# Patient Record
Sex: MALE | Race: WHITE | NOT HISPANIC OR LATINO | URBAN - METROPOLITAN AREA
[De-identification: names, ages, dates, MRNs, and addresses within clinical notes are randomized per-mention and may not be internally consistent; named-entity substitution may affect disease eponyms.]

---

## 2019-01-01 ENCOUNTER — INPATIENT (INPATIENT)
Facility: HOSPITAL | Age: 84
LOS: 2 days | End: 2019-09-03
Attending: INTERNAL MEDICINE | Admitting: INTERNAL MEDICINE
Payer: MEDICARE

## 2019-01-01 VITALS
DIASTOLIC BLOOD PRESSURE: 91 MMHG | WEIGHT: 220.02 LBS | OXYGEN SATURATION: 95 % | HEIGHT: 70 IN | HEART RATE: 108 BPM | SYSTOLIC BLOOD PRESSURE: 161 MMHG | TEMPERATURE: 99 F | RESPIRATION RATE: 16 BRPM

## 2019-01-01 VITALS — RESPIRATION RATE: 6 BRPM

## 2019-01-01 DIAGNOSIS — A41.51 SEPSIS DUE TO ESCHERICHIA COLI [E. COLI]: ICD-10-CM

## 2019-01-01 DIAGNOSIS — N39.0 URINARY TRACT INFECTION, SITE NOT SPECIFIED: ICD-10-CM

## 2019-01-01 DIAGNOSIS — Z96.653 PRESENCE OF ARTIFICIAL KNEE JOINT, BILATERAL: Chronic | ICD-10-CM

## 2019-01-01 DIAGNOSIS — H25.89 OTHER AGE-RELATED CATARACT: Chronic | ICD-10-CM

## 2019-01-01 DIAGNOSIS — Z95.5 PRESENCE OF CORONARY ANGIOPLASTY IMPLANT AND GRAFT: Chronic | ICD-10-CM

## 2019-01-01 LAB
-  AMIKACIN: SIGNIFICANT CHANGE UP
-  AMIKACIN: SIGNIFICANT CHANGE UP
-  AMPICILLIN/SULBACTAM: SIGNIFICANT CHANGE UP
-  AMPICILLIN/SULBACTAM: SIGNIFICANT CHANGE UP
-  AMPICILLIN: SIGNIFICANT CHANGE UP
-  AMPICILLIN: SIGNIFICANT CHANGE UP
-  AZTREONAM: SIGNIFICANT CHANGE UP
-  AZTREONAM: SIGNIFICANT CHANGE UP
-  CEFAZOLIN: SIGNIFICANT CHANGE UP
-  CEFAZOLIN: SIGNIFICANT CHANGE UP
-  CEFEPIME: SIGNIFICANT CHANGE UP
-  CEFEPIME: SIGNIFICANT CHANGE UP
-  CEFOXITIN: SIGNIFICANT CHANGE UP
-  CEFOXITIN: SIGNIFICANT CHANGE UP
-  CEFTRIAXONE: SIGNIFICANT CHANGE UP
-  CEFTRIAXONE: SIGNIFICANT CHANGE UP
-  CIPROFLOXACIN: SIGNIFICANT CHANGE UP
-  CIPROFLOXACIN: SIGNIFICANT CHANGE UP
-  ERTAPENEM: SIGNIFICANT CHANGE UP
-  GENTAMICIN: SIGNIFICANT CHANGE UP
-  GENTAMICIN: SIGNIFICANT CHANGE UP
-  IMIPENEM: SIGNIFICANT CHANGE UP
-  IMIPENEM: SIGNIFICANT CHANGE UP
-  LEVOFLOXACIN: SIGNIFICANT CHANGE UP
-  LEVOFLOXACIN: SIGNIFICANT CHANGE UP
-  MEROPENEM: SIGNIFICANT CHANGE UP
-  MEROPENEM: SIGNIFICANT CHANGE UP
-  NITROFURANTOIN: SIGNIFICANT CHANGE UP
-  PIPERACILLIN/TAZOBACTAM: SIGNIFICANT CHANGE UP
-  PIPERACILLIN/TAZOBACTAM: SIGNIFICANT CHANGE UP
-  TIGECYCLINE: SIGNIFICANT CHANGE UP
-  TOBRAMYCIN: SIGNIFICANT CHANGE UP
-  TOBRAMYCIN: SIGNIFICANT CHANGE UP
-  TRIMETHOPRIM/SULFAMETHOXAZOLE: SIGNIFICANT CHANGE UP
-  TRIMETHOPRIM/SULFAMETHOXAZOLE: SIGNIFICANT CHANGE UP
ALBUMIN SERPL ELPH-MCNC: 3.2 G/DL — LOW (ref 3.5–5.2)
ALBUMIN SERPL ELPH-MCNC: 3.8 G/DL — SIGNIFICANT CHANGE UP (ref 3.5–5.2)
ALBUMIN SERPL ELPH-MCNC: 4.4 G/DL — SIGNIFICANT CHANGE UP (ref 3.5–5.2)
ALP SERPL-CCNC: 71 U/L — SIGNIFICANT CHANGE UP (ref 30–115)
ALP SERPL-CCNC: 76 U/L — SIGNIFICANT CHANGE UP (ref 30–115)
ALP SERPL-CCNC: 84 U/L — SIGNIFICANT CHANGE UP (ref 30–115)
ALT FLD-CCNC: 12 U/L — SIGNIFICANT CHANGE UP (ref 0–41)
ALT FLD-CCNC: 13 U/L — SIGNIFICANT CHANGE UP (ref 0–41)
ALT FLD-CCNC: 14 U/L — SIGNIFICANT CHANGE UP (ref 0–41)
ANION GAP SERPL CALC-SCNC: 13 MMOL/L — SIGNIFICANT CHANGE UP (ref 7–14)
ANION GAP SERPL CALC-SCNC: 14 MMOL/L — SIGNIFICANT CHANGE UP (ref 7–14)
ANION GAP SERPL CALC-SCNC: 15 MMOL/L — HIGH (ref 7–14)
ANION GAP SERPL CALC-SCNC: 18 MMOL/L — HIGH (ref 7–14)
ANISOCYTOSIS BLD QL: SLIGHT — SIGNIFICANT CHANGE UP
APPEARANCE UR: ABNORMAL
APTT BLD: 26.8 SEC — LOW (ref 27–39.2)
APTT BLD: 28.4 SEC — SIGNIFICANT CHANGE UP (ref 27–39.2)
AST SERPL-CCNC: 27 U/L — SIGNIFICANT CHANGE UP (ref 0–41)
AST SERPL-CCNC: 27 U/L — SIGNIFICANT CHANGE UP (ref 0–41)
AST SERPL-CCNC: 32 U/L — SIGNIFICANT CHANGE UP (ref 0–41)
BACTERIA # UR AUTO: ABNORMAL /HPF
BASE EXCESS BLDA CALC-SCNC: -3 MMOL/L — LOW (ref -2–2)
BASE EXCESS BLDV CALC-SCNC: 3.6 MMOL/L — HIGH (ref -2–2)
BASOPHILS # BLD AUTO: 0 K/UL — SIGNIFICANT CHANGE UP (ref 0–0.2)
BASOPHILS # BLD AUTO: 0.05 K/UL — SIGNIFICANT CHANGE UP (ref 0–0.2)
BASOPHILS NFR BLD AUTO: 0 % — SIGNIFICANT CHANGE UP (ref 0–1)
BASOPHILS NFR BLD AUTO: 0.2 % — SIGNIFICANT CHANGE UP (ref 0–1)
BILIRUB SERPL-MCNC: 2.5 MG/DL — HIGH (ref 0.2–1.2)
BILIRUB SERPL-MCNC: 2.7 MG/DL — HIGH (ref 0.2–1.2)
BILIRUB SERPL-MCNC: 3.6 MG/DL — HIGH (ref 0.2–1.2)
BILIRUB UR-MCNC: NEGATIVE — SIGNIFICANT CHANGE UP
BLD GP AB SCN SERPL QL: SIGNIFICANT CHANGE UP
BUN SERPL-MCNC: 15 MG/DL — SIGNIFICANT CHANGE UP (ref 10–20)
BUN SERPL-MCNC: 16 MG/DL — SIGNIFICANT CHANGE UP (ref 10–20)
BUN SERPL-MCNC: 20 MG/DL — SIGNIFICANT CHANGE UP (ref 10–20)
BUN SERPL-MCNC: 21 MG/DL — HIGH (ref 10–20)
BURR CELLS BLD QL SMEAR: PRESENT — SIGNIFICANT CHANGE UP
CA-I SERPL-SCNC: 1.16 MMOL/L — SIGNIFICANT CHANGE UP (ref 1.12–1.3)
CALCIUM SERPL-MCNC: 7.8 MG/DL — LOW (ref 8.5–10.1)
CALCIUM SERPL-MCNC: 7.9 MG/DL — LOW (ref 8.5–10.1)
CALCIUM SERPL-MCNC: 8.5 MG/DL — SIGNIFICANT CHANGE UP (ref 8.5–10.1)
CALCIUM SERPL-MCNC: 9.4 MG/DL — SIGNIFICANT CHANGE UP (ref 8.5–10.1)
CHLORIDE SERPL-SCNC: 102 MMOL/L — SIGNIFICANT CHANGE UP (ref 98–110)
CHLORIDE SERPL-SCNC: 104 MMOL/L — SIGNIFICANT CHANGE UP (ref 98–110)
CHLORIDE SERPL-SCNC: 99 MMOL/L — SIGNIFICANT CHANGE UP (ref 98–110)
CHLORIDE SERPL-SCNC: 99 MMOL/L — SIGNIFICANT CHANGE UP (ref 98–110)
CK MB CFR SERPL CALC: 1.9 NG/ML — SIGNIFICANT CHANGE UP (ref 0.6–6.3)
CK SERPL-CCNC: 61 U/L — SIGNIFICANT CHANGE UP (ref 0–225)
CO2 SERPL-SCNC: 21 MMOL/L — SIGNIFICANT CHANGE UP (ref 17–32)
CO2 SERPL-SCNC: 21 MMOL/L — SIGNIFICANT CHANGE UP (ref 17–32)
CO2 SERPL-SCNC: 22 MMOL/L — SIGNIFICANT CHANGE UP (ref 17–32)
CO2 SERPL-SCNC: 27 MMOL/L — SIGNIFICANT CHANGE UP (ref 17–32)
COLOR SPEC: YELLOW — SIGNIFICANT CHANGE UP
CREAT SERPL-MCNC: 1 MG/DL — SIGNIFICANT CHANGE UP (ref 0.7–1.5)
CREAT SERPL-MCNC: 1.2 MG/DL — SIGNIFICANT CHANGE UP (ref 0.7–1.5)
CREAT SERPL-MCNC: 1.2 MG/DL — SIGNIFICANT CHANGE UP (ref 0.7–1.5)
CREAT SERPL-MCNC: 1.3 MG/DL — SIGNIFICANT CHANGE UP (ref 0.7–1.5)
CULTURE RESULTS: SIGNIFICANT CHANGE UP
DIFF PNL FLD: ABNORMAL
E COLI DNA BLD POS QL NAA+NON-PROBE: SIGNIFICANT CHANGE UP
EOSINOPHIL # BLD AUTO: 0 K/UL — SIGNIFICANT CHANGE UP (ref 0–0.7)
EOSINOPHIL NFR BLD AUTO: 0 % — SIGNIFICANT CHANGE UP (ref 0–8)
EOSINOPHIL NFR BLD AUTO: 0 % — SIGNIFICANT CHANGE UP (ref 0–8)
GAS PNL BLDA: SIGNIFICANT CHANGE UP
GAS PNL BLDV: 141 MMOL/L — SIGNIFICANT CHANGE UP (ref 136–145)
GAS PNL BLDV: SIGNIFICANT CHANGE UP
GLUCOSE BLDC GLUCOMTR-MCNC: 101 MG/DL — HIGH (ref 70–99)
GLUCOSE BLDC GLUCOMTR-MCNC: 112 MG/DL — HIGH (ref 70–99)
GLUCOSE BLDC GLUCOMTR-MCNC: 120 MG/DL — HIGH (ref 70–99)
GLUCOSE SERPL-MCNC: 112 MG/DL — HIGH (ref 70–99)
GLUCOSE SERPL-MCNC: 140 MG/DL — HIGH (ref 70–99)
GLUCOSE SERPL-MCNC: 149 MG/DL — HIGH (ref 70–99)
GLUCOSE SERPL-MCNC: 156 MG/DL — HIGH (ref 70–99)
GLUCOSE UR QL: NEGATIVE MG/DL — SIGNIFICANT CHANGE UP
GRAM STN FLD: SIGNIFICANT CHANGE UP
HCO3 BLDA-SCNC: 21 MMOL/L — LOW (ref 23–27)
HCO3 BLDV-SCNC: 29 MMOL/L — SIGNIFICANT CHANGE UP (ref 22–29)
HCT VFR BLD CALC: 35.9 % — LOW (ref 42–52)
HCT VFR BLD CALC: 36 % — LOW (ref 42–52)
HCT VFR BLD CALC: 36.6 % — LOW (ref 42–52)
HCT VFR BLD CALC: 41.1 % — LOW (ref 42–52)
HCT VFR BLDA CALC: 51.6 % — HIGH (ref 34–44)
HGB BLD CALC-MCNC: 16.8 G/DL — SIGNIFICANT CHANGE UP (ref 14–18)
HGB BLD-MCNC: 11.9 G/DL — LOW (ref 14–18)
HGB BLD-MCNC: 12.1 G/DL — LOW (ref 14–18)
HGB BLD-MCNC: 12.3 G/DL — LOW (ref 14–18)
HGB BLD-MCNC: 14 G/DL — SIGNIFICANT CHANGE UP (ref 14–18)
HOROWITZ INDEX BLDA+IHG-RTO: 21 — SIGNIFICANT CHANGE UP
HOROWITZ INDEX BLDV+IHG-RTO: 21 — SIGNIFICANT CHANGE UP
IMM GRANULOCYTES NFR BLD AUTO: 0.8 % — HIGH (ref 0.1–0.3)
INR BLD: 1.12 RATIO — SIGNIFICANT CHANGE UP (ref 0.65–1.3)
INR BLD: 1.25 RATIO — SIGNIFICANT CHANGE UP (ref 0.65–1.3)
KETONES UR-MCNC: NEGATIVE — SIGNIFICANT CHANGE UP
LACTATE BLDV-MCNC: 2.2 MMOL/L — HIGH (ref 0.5–1.6)
LACTATE SERPL-SCNC: 2.1 MMOL/L — SIGNIFICANT CHANGE UP (ref 0.5–2.2)
LACTATE SERPL-SCNC: 5.4 MMOL/L — CRITICAL HIGH (ref 0.5–2.2)
LEUKOCYTE ESTERASE UR-ACNC: ABNORMAL
LYMPHOCYTES # BLD AUTO: 1.03 K/UL — LOW (ref 1.2–3.4)
LYMPHOCYTES # BLD AUTO: 3.53 K/UL — HIGH (ref 1.2–3.4)
LYMPHOCYTES # BLD AUTO: 4.3 % — LOW (ref 20.5–51.1)
LYMPHOCYTES # BLD AUTO: 9 % — LOW (ref 20.5–51.1)
MACROCYTES BLD QL: SLIGHT — SIGNIFICANT CHANGE UP
MAGNESIUM SERPL-MCNC: 1.4 MG/DL — LOW (ref 1.8–2.4)
MAGNESIUM SERPL-MCNC: 2.3 MG/DL — SIGNIFICANT CHANGE UP (ref 1.8–2.4)
MANUAL DIF COMMENT BLD-IMP: SIGNIFICANT CHANGE UP
MANUAL SMEAR VERIFICATION: SIGNIFICANT CHANGE UP
MCHC RBC-ENTMCNC: 33.1 G/DL — SIGNIFICANT CHANGE UP (ref 32–37)
MCHC RBC-ENTMCNC: 33.2 PG — HIGH (ref 27–31)
MCHC RBC-ENTMCNC: 33.5 PG — HIGH (ref 27–31)
MCHC RBC-ENTMCNC: 33.6 G/DL — SIGNIFICANT CHANGE UP (ref 32–37)
MCHC RBC-ENTMCNC: 33.6 PG — HIGH (ref 27–31)
MCHC RBC-ENTMCNC: 33.6 PG — HIGH (ref 27–31)
MCHC RBC-ENTMCNC: 33.7 G/DL — SIGNIFICANT CHANGE UP (ref 32–37)
MCHC RBC-ENTMCNC: 34.1 G/DL — SIGNIFICANT CHANGE UP (ref 32–37)
MCV RBC AUTO: 100.6 FL — HIGH (ref 80–94)
MCV RBC AUTO: 98.6 FL — HIGH (ref 80–94)
MCV RBC AUTO: 99.7 FL — HIGH (ref 80–94)
MCV RBC AUTO: 99.7 FL — HIGH (ref 80–94)
METAMYELOCYTES # FLD: 1 % — HIGH (ref 0–0)
METHOD TYPE: SIGNIFICANT CHANGE UP
MICROCYTES BLD QL: SLIGHT — SIGNIFICANT CHANGE UP
MONOCYTES # BLD AUTO: 0.66 K/UL — HIGH (ref 0.1–0.6)
MONOCYTES # BLD AUTO: 0.78 K/UL — HIGH (ref 0.1–0.6)
MONOCYTES NFR BLD AUTO: 2 % — SIGNIFICANT CHANGE UP (ref 1.7–9.3)
MONOCYTES NFR BLD AUTO: 2.8 % — SIGNIFICANT CHANGE UP (ref 1.7–9.3)
NEUTROPHILS # BLD AUTO: 21.83 K/UL — HIGH (ref 1.4–6.5)
NEUTROPHILS # BLD AUTO: 33.29 K/UL — HIGH (ref 1.4–6.5)
NEUTROPHILS NFR BLD AUTO: 62 % — SIGNIFICANT CHANGE UP (ref 42.2–75.2)
NEUTROPHILS NFR BLD AUTO: 91.9 % — HIGH (ref 42.2–75.2)
NEUTS BAND # BLD: 23 % — HIGH (ref 0–6)
NEUTS BAND # BLD: 9 % — HIGH (ref 0–6)
NEUTS VAC BLD QL SMEAR: SLIGHT — SIGNIFICANT CHANGE UP
NITRITE UR-MCNC: POSITIVE
NRBC # BLD: 0 /100 WBCS — SIGNIFICANT CHANGE UP (ref 0–0)
NRBC # BLD: 0 /100 — SIGNIFICANT CHANGE UP (ref 0–0)
NRBC # BLD: 0 /100 — SIGNIFICANT CHANGE UP (ref 0–0)
NRBC # BLD: SIGNIFICANT CHANGE UP /100 WBCS (ref 0–0)
ORGANISM # SPEC MICROSCOPIC CNT: SIGNIFICANT CHANGE UP
PCO2 BLDA: 33 MMHG — LOW (ref 38–42)
PCO2 BLDV: 44 MMHG — SIGNIFICANT CHANGE UP (ref 41–51)
PH BLDA: 7.41 — SIGNIFICANT CHANGE UP (ref 7.38–7.42)
PH BLDV: 7.43 — SIGNIFICANT CHANGE UP (ref 7.26–7.43)
PH UR: 6.5 — SIGNIFICANT CHANGE UP (ref 5–8)
PLAT MORPH BLD: NORMAL — SIGNIFICANT CHANGE UP
PLAT MORPH BLD: SIGNIFICANT CHANGE UP
PLATELET # BLD AUTO: 152 K/UL — SIGNIFICANT CHANGE UP (ref 130–400)
PLATELET # BLD AUTO: 154 K/UL — SIGNIFICANT CHANGE UP (ref 130–400)
PLATELET # BLD AUTO: 226 K/UL — SIGNIFICANT CHANGE UP (ref 130–400)
PLATELET # BLD AUTO: 227 K/UL — SIGNIFICANT CHANGE UP (ref 130–400)
PO2 BLDA: 74 MMHG — LOW (ref 78–95)
PO2 BLDV: 21 MMHG — SIGNIFICANT CHANGE UP (ref 20–40)
POTASSIUM BLDV-SCNC: 4.2 MMOL/L — SIGNIFICANT CHANGE UP (ref 3.3–5.6)
POTASSIUM SERPL-MCNC: 3.8 MMOL/L — SIGNIFICANT CHANGE UP (ref 3.5–5)
POTASSIUM SERPL-MCNC: 3.8 MMOL/L — SIGNIFICANT CHANGE UP (ref 3.5–5)
POTASSIUM SERPL-MCNC: 4.1 MMOL/L — SIGNIFICANT CHANGE UP (ref 3.5–5)
POTASSIUM SERPL-MCNC: 4.1 MMOL/L — SIGNIFICANT CHANGE UP (ref 3.5–5)
POTASSIUM SERPL-SCNC: 3.8 MMOL/L — SIGNIFICANT CHANGE UP (ref 3.5–5)
POTASSIUM SERPL-SCNC: 3.8 MMOL/L — SIGNIFICANT CHANGE UP (ref 3.5–5)
POTASSIUM SERPL-SCNC: 4.1 MMOL/L — SIGNIFICANT CHANGE UP (ref 3.5–5)
POTASSIUM SERPL-SCNC: 4.1 MMOL/L — SIGNIFICANT CHANGE UP (ref 3.5–5)
PROT SERPL-MCNC: 5 G/DL — LOW (ref 6–8)
PROT SERPL-MCNC: 5.7 G/DL — LOW (ref 6–8)
PROT SERPL-MCNC: 6.7 G/DL — SIGNIFICANT CHANGE UP (ref 6–8)
PROT UR-MCNC: 30 MG/DL
PROTHROM AB SERPL-ACNC: 12.9 SEC — HIGH (ref 9.95–12.87)
PROTHROM AB SERPL-ACNC: 14.4 SEC — HIGH (ref 9.95–12.87)
RBC # BLD: 3.58 M/UL — LOW (ref 4.7–6.1)
RBC # BLD: 3.6 M/UL — LOW (ref 4.7–6.1)
RBC # BLD: 3.67 M/UL — LOW (ref 4.7–6.1)
RBC # BLD: 4.17 M/UL — LOW (ref 4.7–6.1)
RBC # FLD: 15.8 % — HIGH (ref 11.5–14.5)
RBC # FLD: 15.9 % — HIGH (ref 11.5–14.5)
RBC # FLD: 16 % — HIGH (ref 11.5–14.5)
RBC # FLD: 16.3 % — HIGH (ref 11.5–14.5)
RBC BLD AUTO: ABNORMAL
RBC BLD AUTO: NORMAL — SIGNIFICANT CHANGE UP
RBC CASTS # UR COMP ASSIST: >50 /HPF
SAO2 % BLDA: 95 % — SIGNIFICANT CHANGE UP (ref 94–98)
SAO2 % BLDV: 32 % — SIGNIFICANT CHANGE UP
SODIUM SERPL-SCNC: 137 MMOL/L — SIGNIFICANT CHANGE UP (ref 135–146)
SODIUM SERPL-SCNC: 138 MMOL/L — SIGNIFICANT CHANGE UP (ref 135–146)
SODIUM SERPL-SCNC: 139 MMOL/L — SIGNIFICANT CHANGE UP (ref 135–146)
SODIUM SERPL-SCNC: 141 MMOL/L — SIGNIFICANT CHANGE UP (ref 135–146)
SP GR SPEC: 1.01 — SIGNIFICANT CHANGE UP (ref 1.01–1.03)
SPECIMEN SOURCE: SIGNIFICANT CHANGE UP
TOXIC GRANULES BLD QL SMEAR: PRESENT — SIGNIFICANT CHANGE UP
TROPONIN T SERPL-MCNC: 0.02 NG/ML — HIGH
TROPONIN T SERPL-MCNC: 0.03 NG/ML — CRITICAL HIGH
TROPONIN T SERPL-MCNC: <0.01 NG/ML — SIGNIFICANT CHANGE UP
UROBILINOGEN FLD QL: 0.2 MG/DL — SIGNIFICANT CHANGE UP (ref 0.2–0.2)
VARIANT LYMPHS # BLD: 3 % — SIGNIFICANT CHANGE UP (ref 0–5)
WBC # BLD: 16.67 K/UL — HIGH (ref 4.8–10.8)
WBC # BLD: 23.76 K/UL — HIGH (ref 4.8–10.8)
WBC # BLD: 39.17 K/UL — HIGH (ref 4.8–10.8)
WBC # BLD: 7.62 K/UL — SIGNIFICANT CHANGE UP (ref 4.8–10.8)
WBC # FLD AUTO: 16.67 K/UL — HIGH (ref 4.8–10.8)
WBC # FLD AUTO: 23.76 K/UL — HIGH (ref 4.8–10.8)
WBC # FLD AUTO: 39.17 K/UL — HIGH (ref 4.8–10.8)
WBC # FLD AUTO: 7.62 K/UL — SIGNIFICANT CHANGE UP (ref 4.8–10.8)
WBC UR QL: SIGNIFICANT CHANGE UP /HPF

## 2019-01-01 PROCEDURE — 99223 1ST HOSP IP/OBS HIGH 75: CPT | Mod: AI

## 2019-01-01 PROCEDURE — 99233 SBSQ HOSP IP/OBS HIGH 50: CPT | Mod: 25

## 2019-01-01 PROCEDURE — 93970 EXTREMITY STUDY: CPT | Mod: 26

## 2019-01-01 PROCEDURE — 99233 SBSQ HOSP IP/OBS HIGH 50: CPT

## 2019-01-01 PROCEDURE — 71045 X-RAY EXAM CHEST 1 VIEW: CPT | Mod: 26

## 2019-01-01 PROCEDURE — 71045 X-RAY EXAM CHEST 1 VIEW: CPT | Mod: 26,76,77

## 2019-01-01 PROCEDURE — 99222 1ST HOSP IP/OBS MODERATE 55: CPT

## 2019-01-01 PROCEDURE — 99238 HOSP IP/OBS DSCHRG MGMT 30/<: CPT | Mod: 25

## 2019-01-01 PROCEDURE — 76770 US EXAM ABDO BACK WALL COMP: CPT | Mod: 26

## 2019-01-01 PROCEDURE — 70450 CT HEAD/BRAIN W/O DYE: CPT | Mod: 26

## 2019-01-01 PROCEDURE — 99291 CRITICAL CARE FIRST HOUR: CPT

## 2019-01-01 PROCEDURE — 99291 CRITICAL CARE FIRST HOUR: CPT | Mod: 25

## 2019-01-01 RX ORDER — ENOXAPARIN SODIUM 100 MG/ML
40 INJECTION SUBCUTANEOUS DAILY
Refills: 0 | Status: DISCONTINUED | OUTPATIENT
Start: 2019-01-01 | End: 2019-01-01

## 2019-01-01 RX ORDER — MORPHINE SULFATE 50 MG/1
2 CAPSULE, EXTENDED RELEASE ORAL
Qty: 100 | Refills: 0 | Status: DISCONTINUED | OUTPATIENT
Start: 2019-01-01 | End: 2019-01-01

## 2019-01-01 RX ORDER — CHLORHEXIDINE GLUCONATE 213 G/1000ML
15 SOLUTION TOPICAL EVERY 12 HOURS
Refills: 0 | Status: DISCONTINUED | OUTPATIENT
Start: 2019-01-01 | End: 2019-01-01

## 2019-01-01 RX ORDER — CEFTRIAXONE 500 MG/1
1000 INJECTION, POWDER, FOR SOLUTION INTRAMUSCULAR; INTRAVENOUS ONCE
Refills: 0 | Status: COMPLETED | OUTPATIENT
Start: 2019-01-01 | End: 2019-01-01

## 2019-01-01 RX ORDER — ACETAMINOPHEN 500 MG
650 TABLET ORAL EVERY 6 HOURS
Refills: 0 | Status: DISCONTINUED | OUTPATIENT
Start: 2019-01-01 | End: 2019-01-01

## 2019-01-01 RX ORDER — DEXMEDETOMIDINE HYDROCHLORIDE IN 0.9% SODIUM CHLORIDE 4 UG/ML
0.02 INJECTION INTRAVENOUS
Qty: 200 | Refills: 0 | Status: DISCONTINUED | OUTPATIENT
Start: 2019-01-01 | End: 2019-01-01

## 2019-01-01 RX ORDER — SODIUM CHLORIDE 9 MG/ML
1000 INJECTION, SOLUTION INTRAVENOUS
Refills: 0 | Status: DISCONTINUED | OUTPATIENT
Start: 2019-01-01 | End: 2019-01-01

## 2019-01-01 RX ORDER — MORPHINE SULFATE 50 MG/1
4 CAPSULE, EXTENDED RELEASE ORAL ONCE
Refills: 0 | Status: DISCONTINUED | OUTPATIENT
Start: 2019-01-01 | End: 2019-01-01

## 2019-01-01 RX ORDER — MEROPENEM 1 G/30ML
1000 INJECTION INTRAVENOUS EVERY 8 HOURS
Refills: 0 | Status: DISCONTINUED | OUTPATIENT
Start: 2019-01-01 | End: 2019-01-01

## 2019-01-01 RX ORDER — ACETAMINOPHEN 500 MG
975 TABLET ORAL ONCE
Refills: 0 | Status: COMPLETED | OUTPATIENT
Start: 2019-01-01 | End: 2019-01-01

## 2019-01-01 RX ORDER — NOREPINEPHRINE BITARTRATE/D5W 8 MG/250ML
0.05 PLASTIC BAG, INJECTION (ML) INTRAVENOUS
Qty: 8 | Refills: 0 | Status: DISCONTINUED | OUTPATIENT
Start: 2019-01-01 | End: 2019-01-01

## 2019-01-01 RX ORDER — SODIUM CHLORIDE 9 MG/ML
3100 INJECTION, SOLUTION INTRAVENOUS ONCE
Refills: 0 | Status: COMPLETED | OUTPATIENT
Start: 2019-01-01 | End: 2019-01-01

## 2019-01-01 RX ORDER — MAGNESIUM SULFATE 500 MG/ML
2 VIAL (ML) INJECTION ONCE
Refills: 0 | Status: COMPLETED | OUTPATIENT
Start: 2019-01-01 | End: 2019-01-01

## 2019-01-01 RX ORDER — SODIUM CHLORIDE 9 MG/ML
1000 INJECTION INTRAMUSCULAR; INTRAVENOUS; SUBCUTANEOUS ONCE
Refills: 0 | Status: COMPLETED | OUTPATIENT
Start: 2019-01-01 | End: 2019-01-01

## 2019-01-01 RX ORDER — FENTANYL CITRATE 50 UG/ML
0.5 INJECTION INTRAVENOUS
Qty: 2500 | Refills: 0 | Status: DISCONTINUED | OUTPATIENT
Start: 2019-01-01 | End: 2019-01-01

## 2019-01-01 RX ORDER — ASPIRIN/CALCIUM CARB/MAGNESIUM 324 MG
1 TABLET ORAL
Qty: 0 | Refills: 0 | DISCHARGE

## 2019-01-01 RX ORDER — HEPARIN SODIUM 5000 [USP'U]/ML
5000 INJECTION INTRAVENOUS; SUBCUTANEOUS EVERY 8 HOURS
Refills: 0 | Status: DISCONTINUED | OUTPATIENT
Start: 2019-01-01 | End: 2019-01-01

## 2019-01-01 RX ORDER — CIPROFLOXACIN LACTATE 400MG/40ML
400 VIAL (ML) INTRAVENOUS ONCE
Refills: 0 | Status: COMPLETED | OUTPATIENT
Start: 2019-01-01 | End: 2019-01-01

## 2019-01-01 RX ORDER — ASPIRIN/CALCIUM CARB/MAGNESIUM 324 MG
81 TABLET ORAL DAILY
Refills: 0 | Status: DISCONTINUED | OUTPATIENT
Start: 2019-01-01 | End: 2019-01-01

## 2019-01-01 RX ORDER — FENTANYL CITRATE 50 UG/ML
50 INJECTION INTRAVENOUS ONCE
Refills: 0 | Status: DISCONTINUED | OUTPATIENT
Start: 2019-01-01 | End: 2019-01-01

## 2019-01-01 RX ORDER — MIDAZOLAM HYDROCHLORIDE 1 MG/ML
4 INJECTION, SOLUTION INTRAMUSCULAR; INTRAVENOUS ONCE
Refills: 0 | Status: DISCONTINUED | OUTPATIENT
Start: 2019-01-01 | End: 2019-01-01

## 2019-01-01 RX ORDER — SODIUM CHLORIDE 9 MG/ML
1000 INJECTION INTRAMUSCULAR; INTRAVENOUS; SUBCUTANEOUS
Refills: 0 | Status: DISCONTINUED | OUTPATIENT
Start: 2019-01-01 | End: 2019-01-01

## 2019-01-01 RX ORDER — FUROSEMIDE 40 MG
0 TABLET ORAL
Qty: 0 | Refills: 0 | DISCHARGE

## 2019-01-01 RX ORDER — ENOXAPARIN SODIUM 100 MG/ML
100 INJECTION SUBCUTANEOUS ONCE
Refills: 0 | Status: COMPLETED | OUTPATIENT
Start: 2019-01-01 | End: 2019-01-01

## 2019-01-01 RX ORDER — MORPHINE SULFATE 50 MG/1
5 CAPSULE, EXTENDED RELEASE ORAL
Qty: 100 | Refills: 0 | Status: DISCONTINUED | OUTPATIENT
Start: 2019-01-01 | End: 2019-01-01

## 2019-01-01 RX ORDER — FENTANYL CITRATE 50 UG/ML
0.5 INJECTION INTRAVENOUS
Qty: 5000 | Refills: 0 | Status: DISCONTINUED | OUTPATIENT
Start: 2019-01-01 | End: 2019-01-01

## 2019-01-01 RX ORDER — DEXMEDETOMIDINE HYDROCHLORIDE IN 0.9% SODIUM CHLORIDE 4 UG/ML
0.05 INJECTION INTRAVENOUS
Qty: 200 | Refills: 0 | Status: DISCONTINUED | OUTPATIENT
Start: 2019-01-01 | End: 2019-01-01

## 2019-01-01 RX ORDER — ATENOLOL 25 MG/1
0 TABLET ORAL
Qty: 0 | Refills: 0 | DISCHARGE

## 2019-01-01 RX ORDER — POTASSIUM CHLORIDE 20 MEQ
0 PACKET (EA) ORAL
Qty: 0 | Refills: 0 | DISCHARGE

## 2019-01-01 RX ADMIN — DEXMEDETOMIDINE HYDROCHLORIDE IN 0.9% SODIUM CHLORIDE 0.5 MICROGRAM(S)/KG/HR: 4 INJECTION INTRAVENOUS at 19:46

## 2019-01-01 RX ADMIN — DEXMEDETOMIDINE HYDROCHLORIDE IN 0.9% SODIUM CHLORIDE 0.5 MICROGRAM(S)/KG/HR: 4 INJECTION INTRAVENOUS at 21:09

## 2019-01-01 RX ADMIN — CEFTRIAXONE 1000 MILLIGRAM(S): 500 INJECTION, POWDER, FOR SOLUTION INTRAMUSCULAR; INTRAVENOUS at 15:11

## 2019-01-01 RX ADMIN — MEROPENEM 100 MILLIGRAM(S): 1 INJECTION INTRAVENOUS at 14:17

## 2019-01-01 RX ADMIN — CHLORHEXIDINE GLUCONATE 15 MILLILITER(S): 213 SOLUTION TOPICAL at 05:27

## 2019-01-01 RX ADMIN — SODIUM CHLORIDE 75 MILLILITER(S): 9 INJECTION, SOLUTION INTRAVENOUS at 17:49

## 2019-01-01 RX ADMIN — DEXMEDETOMIDINE HYDROCHLORIDE IN 0.9% SODIUM CHLORIDE 0.5 MICROGRAM(S)/KG/HR: 4 INJECTION INTRAVENOUS at 11:03

## 2019-01-01 RX ADMIN — MORPHINE SULFATE 2 MG/HR: 50 CAPSULE, EXTENDED RELEASE ORAL at 14:41

## 2019-01-01 RX ADMIN — SODIUM CHLORIDE 100 MILLILITER(S): 9 INJECTION, SOLUTION INTRAVENOUS at 21:08

## 2019-01-01 RX ADMIN — SODIUM CHLORIDE 100 MILLILITER(S): 9 INJECTION, SOLUTION INTRAVENOUS at 03:00

## 2019-01-01 RX ADMIN — SODIUM CHLORIDE 3100 MILLILITER(S): 9 INJECTION, SOLUTION INTRAVENOUS at 14:00

## 2019-01-01 RX ADMIN — DEXMEDETOMIDINE HYDROCHLORIDE IN 0.9% SODIUM CHLORIDE 0.5 MICROGRAM(S)/KG/HR: 4 INJECTION INTRAVENOUS at 05:00

## 2019-01-01 RX ADMIN — MEROPENEM 100 MILLIGRAM(S): 1 INJECTION INTRAVENOUS at 15:55

## 2019-01-01 RX ADMIN — SODIUM CHLORIDE 100 MILLILITER(S): 9 INJECTION, SOLUTION INTRAVENOUS at 06:45

## 2019-01-01 RX ADMIN — MORPHINE SULFATE 2 MG/HR: 50 CAPSULE, EXTENDED RELEASE ORAL at 20:50

## 2019-01-01 RX ADMIN — FENTANYL CITRATE 50 MICROGRAM(S): 50 INJECTION INTRAVENOUS at 17:45

## 2019-01-01 RX ADMIN — MEROPENEM 100 MILLIGRAM(S): 1 INJECTION INTRAVENOUS at 05:00

## 2019-01-01 RX ADMIN — MEROPENEM 100 MILLIGRAM(S): 1 INJECTION INTRAVENOUS at 05:11

## 2019-01-01 RX ADMIN — ENOXAPARIN SODIUM 40 MILLIGRAM(S): 100 INJECTION SUBCUTANEOUS at 14:16

## 2019-01-01 RX ADMIN — CHLORHEXIDINE GLUCONATE 15 MILLILITER(S): 213 SOLUTION TOPICAL at 05:01

## 2019-01-01 RX ADMIN — DEXMEDETOMIDINE HYDROCHLORIDE IN 0.9% SODIUM CHLORIDE 0.5 MICROGRAM(S)/KG/HR: 4 INJECTION INTRAVENOUS at 00:34

## 2019-01-01 RX ADMIN — Medication 9.36 MICROGRAM(S)/KG/MIN: at 19:45

## 2019-01-01 RX ADMIN — DEXMEDETOMIDINE HYDROCHLORIDE IN 0.9% SODIUM CHLORIDE 1.25 MICROGRAM(S)/KG/HR: 4 INJECTION INTRAVENOUS at 21:53

## 2019-01-01 RX ADMIN — Medication 9.36 MICROGRAM(S)/KG/MIN: at 00:43

## 2019-01-01 RX ADMIN — MORPHINE SULFATE 5 MG/HR: 50 CAPSULE, EXTENDED RELEASE ORAL at 07:03

## 2019-01-01 RX ADMIN — Medication 9.36 MICROGRAM(S)/KG/MIN: at 06:46

## 2019-01-01 RX ADMIN — FENTANYL CITRATE 50 MICROGRAM(S): 50 INJECTION INTRAVENOUS at 18:38

## 2019-01-01 RX ADMIN — DEXMEDETOMIDINE HYDROCHLORIDE IN 0.9% SODIUM CHLORIDE 1.25 MICROGRAM(S)/KG/HR: 4 INJECTION INTRAVENOUS at 06:49

## 2019-01-01 RX ADMIN — FENTANYL CITRATE 2.5 MICROGRAM(S)/KG/HR: 50 INJECTION INTRAVENOUS at 17:45

## 2019-01-01 RX ADMIN — DEXMEDETOMIDINE HYDROCHLORIDE IN 0.9% SODIUM CHLORIDE 1.25 MICROGRAM(S)/KG/HR: 4 INJECTION INTRAVENOUS at 20:49

## 2019-01-01 RX ADMIN — DEXMEDETOMIDINE HYDROCHLORIDE IN 0.9% SODIUM CHLORIDE 1.25 MICROGRAM(S)/KG/HR: 4 INJECTION INTRAVENOUS at 12:12

## 2019-01-01 RX ADMIN — FENTANYL CITRATE 4.99 MICROGRAM(S)/KG/HR: 50 INJECTION INTRAVENOUS at 03:00

## 2019-01-01 RX ADMIN — MORPHINE SULFATE 5 MG/HR: 50 CAPSULE, EXTENDED RELEASE ORAL at 06:50

## 2019-01-01 RX ADMIN — SODIUM CHLORIDE 100 MILLILITER(S): 9 INJECTION, SOLUTION INTRAVENOUS at 06:46

## 2019-01-01 RX ADMIN — ENOXAPARIN SODIUM 40 MILLIGRAM(S): 100 INJECTION SUBCUTANEOUS at 12:49

## 2019-01-01 RX ADMIN — Medication 975 MILLIGRAM(S): at 15:01

## 2019-01-01 RX ADMIN — SODIUM CHLORIDE 100 MILLILITER(S): 9 INJECTION, SOLUTION INTRAVENOUS at 19:48

## 2019-01-01 RX ADMIN — ENOXAPARIN SODIUM 100 MILLIGRAM(S): 100 INJECTION SUBCUTANEOUS at 02:11

## 2019-01-01 RX ADMIN — MEROPENEM 100 MILLIGRAM(S): 1 INJECTION INTRAVENOUS at 05:22

## 2019-01-01 RX ADMIN — SODIUM CHLORIDE 2000 MILLILITER(S): 9 INJECTION INTRAMUSCULAR; INTRAVENOUS; SUBCUTANEOUS at 21:08

## 2019-01-01 RX ADMIN — MEROPENEM 100 MILLIGRAM(S): 1 INJECTION INTRAVENOUS at 20:02

## 2019-01-01 RX ADMIN — Medication 975 MILLIGRAM(S): at 13:55

## 2019-01-01 RX ADMIN — FENTANYL CITRATE 4.99 MICROGRAM(S)/KG/HR: 50 INJECTION INTRAVENOUS at 22:54

## 2019-01-01 RX ADMIN — CHLORHEXIDINE GLUCONATE 15 MILLILITER(S): 213 SOLUTION TOPICAL at 17:45

## 2019-01-01 RX ADMIN — Medication 50 GRAM(S): at 00:43

## 2019-01-01 RX ADMIN — Medication 81 MILLIGRAM(S): at 11:10

## 2019-01-01 RX ADMIN — DEXMEDETOMIDINE HYDROCHLORIDE IN 0.9% SODIUM CHLORIDE 0.5 MICROGRAM(S)/KG/HR: 4 INJECTION INTRAVENOUS at 03:00

## 2019-01-01 RX ADMIN — HEPARIN SODIUM 5000 UNIT(S): 5000 INJECTION INTRAVENOUS; SUBCUTANEOUS at 21:28

## 2019-01-01 RX ADMIN — DEXMEDETOMIDINE HYDROCHLORIDE IN 0.9% SODIUM CHLORIDE 0.5 MICROGRAM(S)/KG/HR: 4 INJECTION INTRAVENOUS at 06:45

## 2019-01-01 RX ADMIN — SODIUM CHLORIDE 100 MILLILITER(S): 9 INJECTION, SOLUTION INTRAVENOUS at 06:49

## 2019-01-01 RX ADMIN — FENTANYL CITRATE 4.99 MICROGRAM(S)/KG/HR: 50 INJECTION INTRAVENOUS at 06:45

## 2019-01-01 RX ADMIN — MIDAZOLAM HYDROCHLORIDE 4 MILLIGRAM(S): 1 INJECTION, SOLUTION INTRAMUSCULAR; INTRAVENOUS at 15:54

## 2019-01-01 RX ADMIN — CEFTRIAXONE 100 MILLIGRAM(S): 500 INJECTION, POWDER, FOR SOLUTION INTRAMUSCULAR; INTRAVENOUS at 14:59

## 2019-01-01 RX ADMIN — ENOXAPARIN SODIUM 40 MILLIGRAM(S): 100 INJECTION SUBCUTANEOUS at 12:13

## 2019-01-01 RX ADMIN — SODIUM CHLORIDE 100 MILLILITER(S): 9 INJECTION, SOLUTION INTRAVENOUS at 20:51

## 2019-01-01 RX ADMIN — MORPHINE SULFATE 2 MG/HR: 50 CAPSULE, EXTENDED RELEASE ORAL at 20:55

## 2019-01-01 RX ADMIN — MEROPENEM 100 MILLIGRAM(S): 1 INJECTION INTRAVENOUS at 21:52

## 2019-01-01 RX ADMIN — SODIUM CHLORIDE 3100 MILLILITER(S): 9 INJECTION, SOLUTION INTRAVENOUS at 17:49

## 2019-01-01 RX ADMIN — DEXMEDETOMIDINE HYDROCHLORIDE IN 0.9% SODIUM CHLORIDE 0.5 MICROGRAM(S)/KG/HR: 4 INJECTION INTRAVENOUS at 22:53

## 2019-01-01 RX ADMIN — MEROPENEM 100 MILLIGRAM(S): 1 INJECTION INTRAVENOUS at 22:53

## 2019-01-01 RX ADMIN — MORPHINE SULFATE 2 MG/HR: 50 CAPSULE, EXTENDED RELEASE ORAL at 15:37

## 2019-01-01 RX ADMIN — Medication 200 MILLIGRAM(S): at 15:01

## 2019-01-01 RX ADMIN — MORPHINE SULFATE 5 MG/HR: 50 CAPSULE, EXTENDED RELEASE ORAL at 07:04

## 2019-08-31 NOTE — H&P ADULT - ASSESSMENT
89 year old man with history of Hypertension , Coronary artery disease  MI in past, status post stents , not a good historian ( no family present on bedside who was brought for altered mental status , lethargy, fall, weakness He also gives associated complaint of nausea but emesis.      ASSESSMENT:  Encephalopathy secondary to sepsis secondary to Urinary tract infection vs Pyelonephritis   Hypertension   Coronary artery disease , status post stents    PLAN:    Empiric antibiotics  coverage  Blood & urine cx  IV hydration as per sepsis protovol  ID consult  Bladder /renal US  Hoem meds to be reconciled with family  continue with aspirin   PT/Rehab consult.   DASh diet   Goals of care, whenHCP is available  GI Prophylaxis: Protonix 40mg PO QQdaily   DVT Prophylaxis: Heparin 5000 units sc q8hrs 89 year old man with history of Hypertension , Coronary artery disease  MI in past, status post stents , not a good historian ( no family present on bedside who was brought for altered mental status , lethargy, fall, weakness He also gives associated complaint of nausea but emesis.      ASSESSMENT:  Encephalopathy secondary to sepsis secondary to Urinary tract infection vs Pyelonephritis   Severe sepsis( Altered mental status, leucocytosis , pyrexia, lactic acidosis, positive UA)  Hypertension   Coronary artery disease , status post stents    PLAN:    Empiric antibiotics  coverage  Blood & urine cx  IV hydration as per sepsis protovol  ID consult  Bladder /renal US  Hoem meds to be reconciled with family  continue with aspirin   PT/Rehab consult.   DASh diet   Goals of care, whenHCP is available  GI Prophylaxis: Protonix 40mg PO QQdaily   DVT Prophylaxis: Heparin 5000 units sc q8hrs 89 year old man with history of Hypertension , Coronary artery disease  MI in past, status post stents , not a good historian ( no family present on bedside who was brought for altered mental status , lethargy, fall, weakness He also gives associated complaint of nausea but emesis.      ASSESSMENT:  Encephalopathy secondary to sepsis secondary to Urinary tract infection vs Pyelonephritis   Severe sepsis( Altered mental status, leucocytosis with L shift , pyrexia reported prior to admission, lactic acidosis, positive UA)  Hypertension   Coronary artery disease , status post stents    PLAN:    Empiric antibiotics  coverage  Blood & urine cx  IV hydration as per sepsis protocol  ID consult  Bladder /renal   Home meds to be reconciled with family  continue with aspirin   PT/Rehab consult.   DASh diet   Goals of care, when HCP is available  GI Prophylaxis: Protonix 40mg PO QQdaily   DVT Prophylaxis: Heparin 5000 units sc q8hrs

## 2019-08-31 NOTE — ED PROVIDER NOTE - OBJECTIVE STATEMENT
89 y/o male presents with son s/p fall x 2 with increased weakness since yesterday. patient c/o nausea and chills. patient denies any abdominal pain or vomiting but admits to nausea and diarrhea. patient denies any back pain or headaches. patient denies any cough or congestion. as per son , patient with increased weakness and difficulty with ambulation . patient without any visual changes or dizziness. no rashes.

## 2019-08-31 NOTE — H&P ADULT - HISTORY OF PRESENT ILLNESS
89 year old man with history of Hypertension , Coronary artery disease  MI in past, status post stents , not a good historian ( no family present on bedside who was brought for altered mental status , lethargy, fall, weaknes. He also gives associated complaint of nausea but emesis.  He is unable to give any further history , does not remmebr hid home meds .Patient denies any headache, any vision complaints, runny nose, fever, chills, sore throat. Denies chest pain, shortness of breath, palpitation. Denies abdominal pain., Denies urinary burning, urgency, frequency, dysuria. Denies weakness in any part of the body or numbness. 89 year old man with history of Hypertension , Coronary artery disease  MI in past, status post stents , not a good historian ( no family present on bedside) who was brought for altered mental status , lethargy, fall, weaknes. He also gives associated complaint of nausea but no emesis.  He is unable to give any further history , does not remmebr his home meds .Patient denies any headache, any vision complaints, runny nose, fever, chills, sore throat. Denies chest pain, shortness of breath, palpitation. Denies abdominal pain., Denies urinary burning, urgency, frequency, dysuria. Denies weakness in any part of the body or numbness.

## 2019-08-31 NOTE — CONSULT NOTE ADULT - ASSESSMENT
89 year old man with history of HTN, MI, CAD status post stents presents with a chief complaint of altered mental status, weakness, fever.    ASSESSMENT:  Acute Hypoxic Respiratory Failure likely secondary to Aspiration pneumonia  Severe sepsis secondary to UTI and Aspiration pneumonia  Metabolic encephalopathy secondary to sepsis  HTN  CAD    PLAN: 89 year old man with history of HTN, MI, CAD status post stents presents with a chief complaint of altered mental status, weakness, fever.    IMPRESSION:  Acute Hypoxic Respiratory Failure likely secondary to Aspiration pneumonia  Severe sepsis secondary to UTI and Aspiration pneumonia  Metabolic encephalopathy secondary to sepsis  HTN  CAD    PLAN:  CNS: fentanyl 50 push + drip for sedation, RAAS -2    HEENT: oral & ETT care, suction prn    PULMONARY: meropenem for aspiration penumonia, keep O2 > 92 %, CXR URGENT not done yet (tried to contact radiology multiple times)    CARDIOVASCULAR:     GI: GI prophylaxis.     RENAL: follow lytes Is and OS     INFECTIOUS DISEASE:   pan cx     HEMATOLOGICAL:  DVT prophylaxis.    ENDOCRINE:  Follow up FS.  Insulin protocol if needed.    MUSCULOSKELETAL: 89 year old man with history of HTN, MI, CAD status post stents presents with a chief complaint of altered mental status, weakness, fever.    IMPRESSION:    Acute Hypoxic Respiratory Failure/ increase A-a gradient ? aspiration, very unlikely PE in a patient with severe hypoxemia due to PE and hemodynamically stable  Severe sepsis secondary to UTI  Metabolic encephalopathy highly likely due to sepsis, no suspicion of meningoencephalitis  HTN  CAD    PLAN:  CNS: fentanyl 50 push + drip for sedation, RAAS -2    HEENT: oral & ETT care, suction prn    PULMONARY: meropenem for aspiration pneumonia, keep O2 > 92 %, repeat ABG, increase PEEP to 10, CXR daily    CARDIOVASCULAR: gentle hydration, IVF according to tatyana, echo, CE    GI: GI prophylaxis. ngt feeding    RENAL: follow lytes Is and OS , renal US    INFECTIOUS DISEASE:   pan cx , iv abx deescalate after cx    HEMATOLOGICAL:  DVT prophylaxis. LE doppler    ENDOCRINE:  Follow up FS.  Insulin protocol if needed.  spoke at length with son well aware of gravity of his condition

## 2019-08-31 NOTE — ED PROVIDER NOTE - ATTENDING CONTRIBUTION TO CARE
Pt here for two falls yestetrday and increase dweakness.  Some mild cough and diarrhea.  No travel.  Son reports increased confusion - pt on ASA.    Exam: NCAT, CTAB, RRR, soft NT abdomen, stable pelvis, cap refill <2s, dry mucosa  Imp: r/o ICH, r/o sepsis  Plan: labs, xr, ua, ct head

## 2019-08-31 NOTE — CONSULT NOTE ADULT - SUBJECTIVE AND OBJECTIVE BOX
Patient is a 89y old  Male who presents with a chief complaint of altered mental status , weakness, fever (31 Aug 2019 16:09)    HPI:  89 year old man with history of Hypertension, MI, Coronary artery disease status post stents, not a good historian (no family present on bedside) who was brought for altered mental status, lethargy, fall, weakness. He also gives associated complaint of nausea but no emesis.  He is unable to give any further history , does not remmebr his home meds .Patient denies any headache, any vision complaints, runny nose, fever, chills, sore throat. Denies chest pain, shortness of breath, palpitation. Denies abdominal pain., Denies urinary burning, urgency, frequency, dysuria. Denies weakness in any part of the body or numbness. (31 Aug 2019 16:09)      PAST MEDICAL & SURGICAL HISTORY:  Skin cancer  Prostate cancer  Arthritis  Neuropathy  Heart attack  Other age-related cataract of both eyes  History of bilateral knee replacement  Coronary stent patent      SOCIAL HX:   Smoking                         ETOH                            Other    FAMILY HISTORY:    Allergies  No Known Allergies    Intolerances    acetaminophen   Tablet .. 650 milliGRAM(s) Oral every 6 hours PRN  aspirin enteric coated 81 milliGRAM(s) Oral daily  chlorhexidine 0.12% Liquid 15 milliLiter(s) Oral Mucosa every 12 hours  fentaNYL   Infusion. 0.5 MICROgram(s)/kG/Hr IV Continuous <Continuous>  heparin  Injectable 5000 Unit(s) SubCutaneous every 8 hours  meropenem  IVPB 1000 milliGRAM(s) IV Intermittent every 8 hours  sodium chloride 0.45%. 1000 milliLiter(s) IV Continuous <Continuous>  : Home Meds:      PHYSICAL EXAM  ICU Vital Signs Last 24 Hrs  T(C): 37.7 (31 Aug 2019 16:00), Max: 39.1 (31 Aug 2019 13:36)  T(F): 99.8 (31 Aug 2019 16:00), Max: 102.4 (31 Aug 2019 13:36)  HR: 132 (31 Aug 2019 18:21) (95 - 133)  BP: 174/104 (31 Aug 2019 17:30) (114/60 - 174/104)  BP(mean): --  ABP: --  ABP(mean): --  RR: 17 (31 Aug 2019 17:30) (16 - 18)  SpO2: 99% (31 Aug 2019 18:21) (94% - 99%)    General: intubated, sedated  HEENT: NCAT, PERRLA, no tracheal deviation, no JVD  Lymph Nodes: No cervical LN   Lungs: DAEB, b/l crackles mid-lobes to bases  Cardiovascular: S1 S2, RRR  Abdomen: soft, +BS, NTND  Extremities: + PP b/l, mild LLE b/l  Skin: warm  Neurological: Unable to assess due to sedation      19 @ 07:01  -  19 @ 18:56  --------------------------------------------------------  IN:    IV PiggyBack: 250 mL    Lactated Ringers IV Bolus: 3200 mL  Total IN: 3450 mL  OUT:    Ureteral Catheter: 400 mL  Total OUT: 400 mL  Total NET: 3050 mL        LABS:             12.3   7.62  )-----------( 152      ( 31 Aug 2019 18:05 )             36.6                                                   138  |  99  |  15  ----------------------------<  149<H>  3.8   |  21  |  1.0    Ca    8.5      31 Aug 2019 18:05  Mg     1.4         TPro  5.7<L>  /  Alb  3.8  /  TBili  2.5<H>  /  DBili  x   /  AST  27  /  ALT  12  /  AlkPhos  71      PT/INR - ( 31 Aug 2019 18:05 )   PT: 14.40 sec;   INR: 1.25 ratio    PTT - ( 31 Aug 2019 18:05 )  PTT:26.8 sec                                             Urinalysis Basic - ( 31 Aug 2019 14:18 )  Color: Yellow / Appearance: Slightly Cloudy / S.015 / pH: x  Gluc: x / Ketone: Negative  / Bili: Negative / Urobili: 0.2 mg/dL   Blood: x / Protein: 30 mg/dL / Nitrite: Positive   Leuk Esterase: Moderate / RBC: >50 /HPF / WBC 3-5 /HPF   Sq Epi: x / Non Sq Epi: x / Bacteria: TNTC /HPF    CARDIAC MARKERS ( 31 Aug 2019 18:05 )  x     / <0.01 ng/mL / 61 U/L / x     / 1.9 ng/mL    LIVER FUNCTIONS - ( 31 Aug 2019 18:05 )  Alb: 3.8 g/dL / Pro: 5.7 g/dL / ALK PHOS: 71 U/L / ALT: 12 U/L / AST: 27 U/L / GGT: x                                              Troponin T, Serum (19 @ 18:05)    Troponin T, Serum: <0.01 ng/mL  CKMB Units (19 @ 18:05)    CKMB Units: 1.9 ng/mL      Mode: Auto Mode: PRVC/ Volume Support  RR (machine): 15  TV (machine): 450  FiO2: 100  PEEP: 5  MAP: 15  PIP: 22    ABG - ( 31 Aug 2019 17:57 )  pH, Arterial: 7.41  pH, Blood: x     /  pCO2: 33    /  pO2: 74    / HCO3: 21    / Base Excess: -3.0  /  SaO2: 95          X-Rays        PENDING                                                                            ECHO    EKG     MEDICATIONS  (STANDING):  aspirin enteric coated 81 milliGRAM(s) Oral daily  chlorhexidine 0.12% Liquid 15 milliLiter(s) Oral Mucosa every 12 hours  fentaNYL   Infusion. 0.5 MICROgram(s)/kG/Hr (2.495 mL/Hr) IV Continuous <Continuous>  heparin  Injectable 5000 Unit(s) SubCutaneous every 8 hours  meropenem  IVPB 1000 milliGRAM(s) IV Intermittent every 8 hours  sodium chloride 0.45%. 1000 milliLiter(s) (75 mL/Hr) IV Continuous <Continuous>    MEDICATIONS  (PRN):  acetaminophen   Tablet .. 650 milliGRAM(s) Oral every 6 hours PRN Temp greater or equal to 38C (100.4F), Mild Pain (1 - 3), Moderate Pain (4 - 6)      Radiology  < from: CT Head No Cont (19 @ 14:33) >  IMPRESSION:  No CT evidence of acute intracranial pathology.  Microvascular ischemic changes.  Bilateral basal ganglia calcifications.  < end of copied text > Patient is a 89y old  Male who presents with a chief complaint of altered mental status , weakness, fever (31 Aug 2019 16:09)    HPI:  89 year old man with history of Hypertension, MI, Coronary artery disease status post stents, not a good historian who was brought for altered mental status, lethargy, fall, weakness. He also gives associated complaint of nausea but no emesis.  He is unable to give any further history , does not remember his home meds .Patient denies any headache, any vision complaints, runny nose, fever, chills, sore throat. Denies chest pain, shortness of breath, palpitation. Denies abdominal pain., Denies urinary burning, urgency, frequency, dysuria. Denies weakness in any part of the body or numbness. (31 Aug 2019 16:09), was admitted to floor with diagnosis of urosepsis, had head CT done, on the floor RRT was called patient c/o SOB/ desaturation was on 100 % NMR with pox 83% anesthesia was called and patient was intubated, spoke to his son at bedside prior to intubation      PAST MEDICAL & SURGICAL HISTORY:  Skin cancer  Prostate cancer  Arthritis  Neuropathy  Heart attack  Other age-related cataract of both eyes  History of bilateral knee replacement  Coronary stent patent      SOCIAL HX:   Smoking  -    FAMILY HISTORY:    Allergies  No Known Allergies    Intolerances    acetaminophen   Tablet .. 650 milliGRAM(s) Oral every 6 hours PRN  aspirin enteric coated 81 milliGRAM(s) Oral daily  chlorhexidine 0.12% Liquid 15 milliLiter(s) Oral Mucosa every 12 hours  fentaNYL   Infusion. 0.5 MICROgram(s)/kG/Hr IV Continuous <Continuous>  heparin  Injectable 5000 Unit(s) SubCutaneous every 8 hours  meropenem  IVPB 1000 milliGRAM(s) IV Intermittent every 8 hours  sodium chloride 0.45%. 1000 milliLiter(s) IV Continuous <Continuous>  : Home Meds:      PHYSICAL EXAM  ICU Vital Signs Last 24 Hrs  T(C): 37.7 (31 Aug 2019 16:00), Max: 39.1 (31 Aug 2019 13:36)  T(F): 99.8 (31 Aug 2019 16:00), Max: 102.4 (31 Aug 2019 13:36)  HR: 132 (31 Aug 2019 18:21) (95 - 133)  BP: 174/104 (31 Aug 2019 17:30) (114/60 - 174/104)  RR: 17 (31 Aug 2019 17:30) (16 - 18)  SpO2: 99% (31 Aug 2019 18:21) (94% - 99%)    General: intubated, sedated  HEENT: NCAT, PERRLA, no tracheal deviation, no JVD  Lymph Nodes: No cervical LN   Lungs: DAEB,  dec bs both bases  Cardiovascular: S1 S2, RRRm DAMIR 3/6  Abdomen: soft, +BS, NTND  Extremities: + PP b/l, mild LLE b/l  Skin: warm  Neurological: Withdraw all extremities to painful stimuli      19 @ 07:01  -  19 @ 18:56  --------------------------------------------------------  IN:    IV PiggyBack: 250 mL    Lactated Ringers IV Bolus: 3200 mL  Total IN: 3450 mL  OUT:    Ureteral Catheter: 400 mL  Total OUT: 400 mL  Total NET: 3050 mL        LABS:             12.3   7.62  )-----------( 152      ( 31 Aug 2019 18:05 )             36.6                                                   138  |  99  |  15  ----------------------------<  149<H>  3.8   |  21  |  1.0    Ca    8.5      31 Aug 2019 18:05  Mg     1.4         TPro  5.7<L>  /  Alb  3.8  /  TBili  2.5<H>  /  DBili  x   /  AST  27  /  ALT  12  /  AlkPhos  71      PT/INR - ( 31 Aug 2019 18:05 )   PT: 14.40 sec;   INR: 1.25 ratio    PTT - ( 31 Aug 2019 18:05 )  PTT:26.8 sec                                             Urinalysis Basic - ( 31 Aug 2019 14:18 )  Color: Yellow / Appearance: Slightly Cloudy / S.015 / pH: x  Gluc: x / Ketone: Negative  / Bili: Negative / Urobili: 0.2 mg/dL   Blood: x / Protein: 30 mg/dL / Nitrite: Positive   Leuk Esterase: Moderate / RBC: >50 /HPF / WBC 3-5 /HPF   Sq Epi: x / Non Sq Epi: x / Bacteria: TNTC /HPF    CARDIAC MARKERS ( 31 Aug 2019 18:05 )  x     / <0.01 ng/mL / 61 U/L / x     / 1.9 ng/mL    LIVER FUNCTIONS - ( 31 Aug 2019 18:05 )  Alb: 3.8 g/dL / Pro: 5.7 g/dL / ALK PHOS: 71 U/L / ALT: 12 U/L / AST: 27 U/L / GGT: x                                              Troponin T, Serum (19 @ 18:05)    Troponin T, Serum: <0.01 ng/mL  CKMB Units (19 @ 18:05)    CKMB Units: 1.9 ng/mL      Mode: Auto Mode: PRVC/ Volume Support  RR (machine): 15  TV (machine): 450  FiO2: 100  PEEP: 5  MAP: 15  PIP: 22    ABG - ( 31 Aug 2019 17:57 )  pH, Arterial: 7.41  pH, Blood: x     /  pCO2: 33    /  pO2: 74    / HCO3: 21    / Base Excess: -3.0  /  SaO2: 95          X-Rays  reviewed    EKG P    MEDICATIONS  (STANDING):  aspirin enteric coated 81 milliGRAM(s) Oral daily  chlorhexidine 0.12% Liquid 15 milliLiter(s) Oral Mucosa every 12 hours  fentaNYL   Infusion. 0.5 MICROgram(s)/kG/Hr (2.495 mL/Hr) IV Continuous <Continuous>  heparin  Injectable 5000 Unit(s) SubCutaneous every 8 hours  meropenem  IVPB 1000 milliGRAM(s) IV Intermittent every 8 hours  sodium chloride 0.45%. 1000 milliLiter(s) (75 mL/Hr) IV Continuous <Continuous>    MEDICATIONS  (PRN):  acetaminophen   Tablet .. 650 milliGRAM(s) Oral every 6 hours PRN Temp greater or equal to 38C (100.4F), Mild Pain (1 - 3), Moderate Pain (4 - 6)      Radiology  < from: CT Head No Cont (19 @ 14:33) >  IMPRESSION:  No CT evidence of acute intracranial pathology.  Microvascular ischemic changes.  Bilateral basal ganglia calcifications.  < end of copied text >

## 2019-08-31 NOTE — CHART NOTE - NSCHARTNOTEFT_GEN_A_CORE
RAPID RESPONSE NOTE    CODE STATUS/ADVANCED DIRECTIVES:       SUBJECTIVE  Patient is a 89y old  Male         admitted for sepsis secondary to suspected Urinary tract infection/pyelonephritis  Rapid response team called because of sudden hypoxia    Patient was seen and examined at the bedside by the rapid response team.    Allergies    No Known Allergies    Intolerances        PAST MEDICAL & SURGICAL HISTORY:  Arthritis  Neuropathy  Heart attack  Coronary stent patent      Vital Signs Last 24 Hrs  T(C): 37.7 (31 Aug 2019 16:00), Max: 39.1 (31 Aug 2019 13:36)  T(F): 99.8 (31 Aug 2019 16:00), Max: 102.4 (31 Aug 2019 13:36)  HR: 95 (31 Aug 2019 16:00) (95 - 109)  BP: 117/61 (31 Aug 2019 16:00) (114/60 - 161/91)  BP(mean): --  RR: 18 (31 Aug 2019 16:00) (16 - 18)  SpO2: 96% (31 Aug 2019 16:00) (94% - 96%)      PHYSICAL EXAMINATION:      ECG/IMAGING:        LABS:                          14.0   23.76 )-----------( 226      ( 31 Aug 2019 14:05 )             41.1         141  |  99  |  16  ----------------------------<  156<H>  4.1   |  27  |  1.2    Ca    9.4      31 Aug 2019 14:05    TPro  6.7  /  Alb  4.4  /  TBili  2.7<H>  /  DBili  x   /  AST  27  /  ALT  13  /  AlkPhos  84           LIVER FUNCTIONS - ( 31 Aug 2019 14:05 )  Alb: 4.4 g/dL / Pro: 6.7 g/dL / ALK PHOS: 84 U/L / ALT: 13 U/L / AST: 27 U/L / GGT: x         Urinalysis Basic - ( 31 Aug 2019 14:18 )    Color: Yellow / Appearance: Slightly Cloudy / S.015 / pH: x  Gluc: x / Ketone: Negative  / Bili: Negative / Urobili: 0.2 mg/dL   Blood: x / Protein: 30 mg/dL / Nitrite: Positive   Leuk Esterase: Moderate / RBC: >50 /HPF / WBC 3-5 /HPF   Sq Epi: x / Non Sq Epi: x / Bacteria: TNTC /HPF         PT/INR - ( 31 Aug 2019 14:05 )   PT: 12.90 sec;   INR: 1.12 ratio         PTT - ( 31 Aug 2019 14:05 )  PTT:28.4 sec    MEDICATIONS  (STANDING):  aspirin enteric coated 81 milliGRAM(s) Oral daily  sodium chloride 0.45%. 1000 milliLiter(s) (75 mL/Hr) IV Continuous <Continuous>    MEDICATIONS  (PRN):  acetaminophen   Tablet .. 650 milliGRAM(s) Oral every 6 hours PRN Temp greater or equal to 38C (100.4F), Mild Pain (1 - 3), Moderate Pain (4 - 6)        ASSESSMENT:  Rapid Response called for 89y year old Male with a past medical history of Hypertension , Coronary artery disease , status post stents.  Patient is a 89y old  Male         admitted for sepsis secondary to suspected Urinary tract infection/pyelonephritis  Rapid response team called because of sudden hypoxia  Patient was seen and examined at the bedside by the rapid response team. Pt was seen to have severe tachypnea, O saturation at 77% on RA , /103 ,   Critical care was consulted immediately and decision was made to intubate after discussing with son present on bedside ( who was not sure if he is DNR/DNI) because of acute respiratory failure. Pt will be transferred to ICU. differential includes aspiration pneumonitis, PE or Acute coronary event.               CRITICAL TIME SPENT:35 mins

## 2019-08-31 NOTE — H&P ADULT - NSHPPHYSICALEXAM_GEN_ALL_CORE
Not in acute distress  General: No pallor, No icterus, afebrile  HEENT: No JVD, no Bruit.  Heart: S1+S2 audible  Lungs: bilateral  fair air entry, no wheezing, no crepitations.  Abdomen: Soft, non-tender, non-distended , no  rigidity or guarding.  CNS: Grossly intact, sensations intact.  Extremities:  No edema

## 2019-08-31 NOTE — H&P ADULT - NSHPREVIEWOFSYSTEMS_GEN_ALL_CORE
Patient denies any headache, any vision complaints, runny nose, fever, chills, sore throat. Denies chest pain, shortness of breath, palpitation. Denies abdominal pain., Denies urinary burning, urgency, frequency, dysuria. Denies weakness in any part of the body or numbness.

## 2019-09-01 NOTE — PROCEDURE NOTE - ADDITIONAL PROCEDURE DETAILS
RIJ attempted however, guidewire could not pass despite blood return. Suspected thrombus. Will order CXR now due to instrumentation.  LIJ was very small and not amenable to cannulation.  Right femoral line placed.

## 2019-09-01 NOTE — PROGRESS NOTE ADULT - ASSESSMENT
89 year old man with history of HTN, MI, CAD status post stents presents with a chief complaint of altered mental status, weakness, fever.    IMPRESSION:    Acute Hypoxic Respiratory Failure/ increase A-a gradient ? aspiration, very unlikely PE in a patient with severe hypoxemia due to PE and hemodynamically stable  Severe sepsis secondary to UTI  Metabolic encephalopathy highly likely due to sepsis, no suspicion of meningoencephalitis  HTN  CAD    PLAN:  CNS: sedation vacation     HEENT: oral & ETT care, suction prn    PULMONARY: meropenem for aspiration pneumonia, keep O2 > 92 %,  now on FIO2 40 %     CARDIOVASCULAR: gentle hydration, IVF according to khanh joe, CE    GI: GI prophylaxis. ngt feeding    RENAL: follow lytes Is and OS , renal US    INFECTIOUS DISEASE:   pan cx , iv abx deescalate after cx    HEMATOLOGICAL:  DVT prophylaxis. LE doppler    ENDOCRINE:  Follow up FS.  Insulin protocol if needed. IMPRESSION:    Acute Hypoxic Respiratory Failure seocndary   to UTI   septic shock   Severe sepsis   Metabolic encephalopathy highly likely due to sepsis, no suspicion of meningoencephalitis  HTN  CAD    PLAN:  CNS: sedation vacation     HEENT: oral & ETT care, suction prn    PULMONARY: meropenem for aspiration pneumonia, keep O2 > 92 %,  now on FIO2 40 %     CARDIOVASCULAR: gentle hydration, IVF according to tatyana, echo, CE    GI: GI prophylaxis. ngt feeding    RENAL: follow lytes Is and OS , renal US    INFECTIOUS DISEASE:   pan cx , iv abx deescalate after cx    HEMATOLOGICAL:  DVT prophylaxis. LE doppler    ENDOCRINE:  Follow up FS.  Insulin protocol if needed. IMPRESSION:    Acute Hypoxic Respiratory Failure seocndary   to UTI   septic shock   Severe sepsis   Metabolic encephalopathy highly likely due to sepsis, no suspicion of meningoencephalitis  HTN  CAD    PLAN:  CNS: sedation vacation     HEENT: oral & ETT care, suction prn    PULMONARY: meropenem for aspiration pneumonia, keep O2 > 92 %,  now on FIO2 40 %     CARDIOVASCULAR: gentle hydration, IVF according to cheetah, echo, CE  Taper pressor for MAp 65    GI: GI prophylaxis. ngt feeding    RENAL: follow lytes Is and OS , renal US    INFECTIOUS DISEASE:   pan cx , iv abx deescalate after cx    HEMATOLOGICAL:  DVT prophylaxis. LE doppler    ENDOCRINE:  Follow up FS.  Insulin protocol if needed.

## 2019-09-01 NOTE — CONSULT NOTE ADULT - ASSESSMENT
89 year old man with history of HTN, MI, CAD status post stents presents with a chief complaint of altered mental status, weakness, fever.    IMPRESSION:  sepsis secondary to acute pyelonephritis secondary to E coli  no acute cholecystis cholangitis  metabolic encephalopathy  respiratory failure on vent  WBC 39.1  BCx 8/31 e coli    RECOMMENDATIONS:  F/u sensitivities of E coli  Meropenem 500 mg iv q8h

## 2019-09-01 NOTE — CONSULT NOTE ADULT - SUBJECTIVE AND OBJECTIVE BOX
JENN CREWS  89y, Male  Allergy: No Known Allergies      HPI:  89 year old man with history of Hypertension , Coronary artery disease  MI in past, status post stents , not a good historian ( no family present on bedside) who was brought for altered mental status , lethargy, fall, weaknes. He also gives associated complaint of nausea but no emesis.  He is unable to give any further history , does not remmebr his home meds .Patient denies any headache, any vision complaints, runny nose, fever, chills, sore throat. Denies chest pain, shortness of breath, palpitation. Denies abdominal pain., Denies urinary burning, urgency, frequency, dysuria. Denies weakness in any part of the body or numbness. (31 Aug 2019 16:09)    FAMILY HISTORY:    PAST MEDICAL & SURGICAL HISTORY:  Skin cancer  Prostate cancer  Arthritis  Neuropathy  Heart attack  Other age-related cataract of both eyes  History of bilateral knee replacement  Coronary stent patent        VITALS:  T(F): 98.6, Max: 102.6 (19 @ 19:00)  HR: 76  BP: 111/60  RR: 15Vital Signs Last 24 Hrs  T(C): 37 (01 Sep 2019 07:01), Max: 39.2 (31 Aug 2019 19:00)  T(F): 98.6 (01 Sep 2019 07:01), Max: 102.6 (31 Aug 2019 19:00)  HR: 76 (01 Sep 2019 10:00) (75 - 133)  BP: 111/60 (01 Sep 2019 10:00) (51/35 - 174/104)  BP(mean): 80 (01 Sep 2019 10:00) (38 - 82)  RR: 15 (01 Sep 2019 10:00) (13 - 28)  SpO2: 100% (01 Sep 2019 10:00) (94% - 100%)    TESTS & MEASUREMENTS:                        12.1   39.17 )-----------( 227      ( 01 Sep 2019 06:55 )             35.9     09    139  |  104  |  20  ----------------------------<  140<H>  4.1   |  21  |  1.3    Ca    7.9<L>      01 Sep 2019 06:55  Mg     2.3         TPro  5.0<L>  /  Alb  3.2<L>  /  TBili  3.6<H>  /  DBili  x   /  AST  32  /  ALT  14  /  AlkPhos  76      LIVER FUNCTIONS - ( 01 Sep 2019 06:55 )  Alb: 3.2 g/dL / Pro: 5.0 g/dL / ALK PHOS: 76 U/L / ALT: 14 U/L / AST: 32 U/L / GGT: x             Culture - Blood (collected 19 @ 14:05)  Source: .Blood Blood-Peripheral  Gram Stain (19 @ 09:58):    Growth in anaerobic bottle: Gram Negative Rods  Preliminary Report (19 @ 09:58):    Growth in anaerobic bottle: Gram Negative Rods    Culture - Blood (collected 19 @ 14:05)  Source: .Blood Blood-Peripheral  Gram Stain (19 @ 08:09):    Growth in anaerobic bottle: Gram Negative Rods  Preliminary Report (19 @ 08:08):    Growth in anaerobic bottle: Gram Negative Rods    "Due to technical problems, Proteus sp. will Not be reported as part of    the BCID panel until further notice"    ***Blood Panel PCR results on this specimen are available    approximately 3 hours after the Gram stain result.***    Gram stain, PCR, and/or culture results may not always    correspond due to difference in methodologies.    ************************************************************    This PCR assay was performed using [employer].    The following targets are tested for: Enterococcus,    vancomycin resistant enterococci, Listeria monocytogenes,    coagulase negative staphylococci, S. aureus,    methicillin resistant S. aureus, Streptococcus agalactiae    (Group B), S. pneumoniae, S. pyogenes (Group A),    Acinetobacter baumannii, Enterobacter cloacae, E. coli,    Klebsiella oxytoca, K. pneumoniae, Proteus sp.,    Serratia marcescens, Haemophilus influenzae,    Neisseria meningitidis, Pseudomonas aeruginosa, Candida    albicans, C. glabrata, C krusei, C parapsilosis,    C. tropicalis and the KPC resistance gene.  Organism: Blood Culture PCR (19 @ 10:07)  Organism: Blood Culture PCR (19 @ 10:07)      -  Escherichia coli: Detec      Method Type: PCR      Urinalysis Basic - ( 31 Aug 2019 14:18 )    Color: Yellow / Appearance: Slightly Cloudy / S.015 / pH: x  Gluc: x / Ketone: Negative  / Bili: Negative / Urobili: 0.2 mg/dL   Blood: x / Protein: 30 mg/dL / Nitrite: Positive   Leuk Esterase: Moderate / RBC: >50 /HPF / WBC 3-5 /HPF   Sq Epi: x / Non Sq Epi: x / Bacteria: TNTC /HPF          RADIOLOGY & ADDITIONAL TESTS:    ANTIBIOTICS:  meropenem  IVPB 1000 milliGRAM(s) IV Intermittent every 8 hours

## 2019-09-02 NOTE — DIETITIAN INITIAL EVALUATION ADULT. - OTHER INFO
Pertinent Medical Information: p/w altered mental status, weakness, fever. Septic shock  secondary to Urinary tract infection with gram negative bacteremia. Metabolic encephalopathy highly likely due to sepsis, no suspicion of meningoencephalitis. Acute Hypoxic Respiratory Failure 2/2 UTI - pt intubated at this time. Weaning trial today.    Pertinent Subjective Information: Unable to obtain history at this time. NKFA per chart.

## 2019-09-02 NOTE — PROGRESS NOTE ADULT - ASSESSMENT
ASSESSMENT:   septic shock  secondary to Urinary tract infection with gram negative bacteremia  Coronary artery disease , status post stents    PLAN:    Goals of care discussion as documented  will liberate pt and dc pressors as per pts wishes  continue Merrem and IVF and Precedex  use morphine IV as needed for comfort  DVT Prophylaxis: Heparin 5000 units sc q8hrs

## 2019-09-02 NOTE — DIETITIAN INITIAL EVALUATION ADULT. - RD TO REMAIN AVAILABLE
yes/Nutrition Intervention: Enteral Nutrition vs. Coordination of Care. Monitoring/Evaluation: Energy intake, diet order, glucose profile, renal profile, nutrition focused physical findings, body composition.

## 2019-09-02 NOTE — GOALS OF CARE CONVERSATION - PERSONAL ADVANCE DIRECTIVE - CONVERSATION DETAILS
Patients wishes as per HCP is DNR and DNI  son requesting to remove ventilator and stop pressors   continue antibiotics and IV fluids  will give IV morphine for any discomfort after liberation  continue Precedex

## 2019-09-02 NOTE — PROGRESS NOTE ADULT - ASSESSMENT
IMPRESSION:    Acute Hypoxic Respiratory Failure seocndary   to UTI   bacteremia   septic shock   Severe sepsis   Metabolic encephalopathy highly likely due to sepsis, no suspicion of meningoencephalitis  HTN  CAD    PLAN:  CNS: sedation vacation     HEENT: oral & ETT care, suction prn    PULMONARY: do weaning trial today after holding sedation and if wake up     CARDIOVASCULA: echo  Taper pressor for MAp 65    GI: GI prophylaxis. ngt feeding    RENAL: follow lytes Is and OS , renal US    INFECTIOUS DISEASE:   on Merop follow sensitivity repeat bld cx follow ID     HEMATOLOGICAL:  DVT prophylaxis. LE doppler    ENDOCRINE:  Follow up FS.  Insulin protocol if needed.

## 2019-09-02 NOTE — DIETITIAN INITIAL EVALUATION ADULT. - PHYSICAL APPEARANCE
BMI: 28.6 kg (using 9/2 wt 90.5 kg). 2+ edema (R foot). Intubated. Last BM 8/29 per staff. OG tube. Skin: ecchymosis noted.

## 2019-09-02 NOTE — DIETITIAN INITIAL EVALUATION ADULT. - NUTRITIONGOAL OUTCOME1
Nutrition regimen determined & initiated as tolerated, ideally to provide >85% & <105% needs x3 days

## 2019-09-02 NOTE — DIETITIAN INITIAL EVALUATION ADULT. - ENERGY NEEDS
Energy: 1338 kcal/day ([id number])    Protein: 108-135 g/day (1.2-1.5 g/kg ABW)    Fluids: per CCU team

## 2019-09-03 NOTE — PROGRESS NOTE ADULT - SUBJECTIVE AND OBJECTIVE BOX
Progress Note:  Provider Speciality                            Hospitalist      JENN CREWS [id number] 89y Male     CHIEF PRESENTING COMPLAINT:  Patient is a 89y old  Male who presents with a chief complaint of altered mental status , weakness, fever (01 Sep 2019 10:46)        SUBJECTIVE:  Patient was seen and examined at bedside. A  review of systems could not be  obtained in this patient.          HISTORY OF PRESENTING ILLNESS:  HPI:  89 year old man with history of Hypertension , Coronary artery disease  MI in past, status post stents , not a good historian ( no family present on bedside) who was brought for altered mental status , lethargy, fall, weaknes. He also gives associated complaint of nausea but no emesis.  He is unable to give any further history , does not remmebr his home meds .Patient denies any headache, any vision complaints, runny nose, fever, chills, sore throat. Denies chest pain, shortness of breath, palpitation. Denies abdominal pain., Denies urinary burning, urgency, frequency, dysuria. Denies weakness in any part of the body or numbness. (31 Aug 2019 16:09)        REVIEW OF SYSTEMS:A  review of systems could not be  in this patient.      PAST MEDICAL & SURGICAL HISTORY:  PAST MEDICAL & SURGICAL HISTORY:  Skin cancer  Prostate cancer  Arthritis  Neuropathy  Heart attack  Other age-related cataract of both eyes  History of bilateral knee replacement  Coronary stent patent          VITAL SIGNS:  Vital Signs Last 24 Hrs  T(C): 37.3 (01 Sep 2019 11:00), Max: 39.2 (31 Aug 2019 19:00)  T(F): 99.1 (01 Sep 2019 11:00), Max: 102.6 (31 Aug 2019 19:00)  HR: 98 (01 Sep 2019 13:00) (75 - 133)  BP: 122/64 (01 Sep 2019 13:00) (51/35 - 174/104)  BP(mean): 87 (01 Sep 2019 13:00) (38 - 87)  RR: 25 (01 Sep 2019 13:00) (13 - 28)  SpO2: 98% (01 Sep 2019 13:00) (95% - 100%)          PHYSICAL EXAMINATION:  Sedated, on Vent  General: No  icterus, afebrile  HEENT:   EOMI, no JVD, no Bruit.  Heart: S1+S2 audible, no murmur  Lungs: no wheezing, no crepitations.  Abdomen: Soft, non-tender, non-distended , no  rigidity or guarding.  CNS: sedated, CN  grossly intact.  Extremities:  No edema            CONSULTS:  Consultant(s) Notes Reviewed by me.   Care Discussed with Consultants/Other Providers where required.        MEDICATIONS:  MEDICATIONS  (STANDING):  aspirin enteric coated 81 milliGRAM(s) Oral daily  chlorhexidine 0.12% Liquid 15 milliLiter(s) Oral Mucosa every 12 hours  dexmedetomidine Infusion 0.02 MICROgram(s)/kG/Hr (0.5 mL/Hr) IV Continuous <Continuous>  enoxaparin Injectable 40 milliGRAM(s) SubCutaneous daily  fentaNYL   Infusion. 0.5 MICROgram(s)/kG/Hr (2.495 mL/Hr) IV Continuous <Continuous>  meropenem  IVPB 1000 milliGRAM(s) IV Intermittent every 8 hours  norepinephrine Infusion 0.05 MICROgram(s)/kG/Min (9.356 mL/Hr) IV Continuous <Continuous>  sodium chloride 0.45%. 1000 milliLiter(s) (100 mL/Hr) IV Continuous <Continuous>    MEDICATIONS  (PRN):  acetaminophen   Tablet .. 650 milliGRAM(s) Oral every 6 hours PRN Temp greater or equal to 38C (100.4F), Mild Pain (1 - 3), Moderate Pain (4 - 6)      LABOROTORY DATA/MICROBIOLOGY/I & O's:                        12.1   39.17 )-----------( 227      ( 01 Sep 2019 06:55 )             35.9     -    139  |  104  |  20  ----------------------------<  140<H>  4.1   |  21  |  1.3    Ca    7.9<L>      01 Sep 2019 06:55  Mg     2.3         TPro  5.0<L>  /  Alb  3.2<L>  /  TBili  3.6<H>  /  DBili  x   /  AST  32  /  ALT  14  /  AlkPhos  76  -    PT/INR - ( 31 Aug 2019 18:05 )   PT: 14.40 sec;   INR: 1.25 ratio         PTT - ( 31 Aug 2019 18:05 )  PTT:26.8 sec  Urinalysis Basic - ( 31 Aug 2019 14:18 )    Color: Yellow / Appearance: Slightly Cloudy / S.015 / pH: x  Gluc: x / Ketone: Negative  / Bili: Negative / Urobili: 0.2 mg/dL   Blood: x / Protein: 30 mg/dL / Nitrite: Positive   Leuk Esterase: Moderate / RBC: >50 /HPF / WBC 3-5 /HPF   Sq Epi: x / Non Sq Epi: x / Bacteria: TNTC /HPF      CAPILLARY BLOOD GLUCOSE      POCT Blood Glucose.: 120 mg/dL (31 Aug 2019 16:52)    ABG - ( 01 Sep 2019 04:28 )  pH, Arterial: 7.39  pH, Blood: x     /  pCO2: 39    /  pO2: 180   / HCO3: 24    / Base Excess: -1.3  /  SaO2: 100                 Urinalysis Basic - ( 31 Aug 2019 14:18 )    Color: Yellow / Appearance: Slightly Cloudy / S.015 / pH: x  Gluc: x / Ketone: Negative  / Bili: Negative / Urobili: 0.2 mg/dL   Blood: x / Protein: 30 mg/dL / Nitrite: Positive   Leuk Esterase: Moderate / RBC: >50 /HPF / WBC 3-5 /HPF   Sq Epi: x / Non Sq Epi: x / Bacteria: TNTC /HPF            19 @ 07:19 @ 07:00  --------------------------------------------------------  IN: 5524 mL / OUT: 1815 mL / NET: 3709 mL    19 @ 07:19 @ 14:17  --------------------------------------------------------  IN: 1194 mL / OUT: 300 mL / NET: 894 mL              ASSESSMENT:        89 year old man with history of Hypertension , Coronary artery disease  MI in past, status post stents , not a good historian ( no family present on bedside) who was brought for   altered mental status , lethargy, fall, weakness. Pt had a rapid response for acute hypoxia and intubated & transferred to ICU    ASSESSMENT:  Acute hypoxic respiratory failure , possibly aspiration   Encephalopathy secondary to sepsis secondary to Urinary tract infection vs Pyelonephritis   Severe sepsis( Altered mental status, leucocytosis with bandemia , pyrexia reported prior to admission, lactic acidosis, positive UA)  E Coli bacteremia  Lactic acidosis secondary to sepsis  Hypertension   Coronary artery disease , status post stents      PLAN:   On Vent, PRVC, Sedated ,  Vent care as per pulmonary/CC  follow up cultures for sensitivity  Empirically on meropenem, ID appreciated  IV hydration as per sepsis protocol  Daily CBC, lytes  Electrolyte supplementation and follow up with BMP. Keep K+>4, Mg>2   follow up TTE, trend trops  CTH , Renal US show no abnormality  follow up venous doppler LE to rule out DVT  GI & DVT Prophylaxis as appropriate.   Goals fo care: discussed with family. Family indecisive at the moment
JENN CREWS  89y, Male  Allergy: No Known Allergies      CHIEF COMPLAINT: altered mental status , weakness, fever (02 Sep 2019 12:19)      INTERVAL EVENTS/HPI  - No acute events overnight  - T(F): , Max: 99.9 (09-02-19 @ 07:01)      ROS  UTO    SOCIAL HISTORY - not relevant     Substance Use (  ) never used  (  ) IVDU (  ) Other:  Tobacco Usage:  (   ) never smoked   (   ) former smoker   (   ) current smoker   Alcohol Usage: (   ) social  (   ) daily use (   ) denies  Sexual History:       FH noncontributory     VITALS:  T(F): 95.8, Max: 99.9 (09-02-19 @ 07:01)  HR: 75  BP: 72/47  RR: 16Vital Signs Last 24 Hrs  T(C): 35.4 (02 Sep 2019 23:01), Max: 37.7 (02 Sep 2019 07:01)  T(F): 95.8 (02 Sep 2019 23:01), Max: 99.9 (02 Sep 2019 07:01)  HR: 75 (03 Sep 2019 00:00) (67 - 154)  BP: 72/47 (03 Sep 2019 00:00) (72/47 - 123/67)  BP(mean): 55 (03 Sep 2019 00:00) (55 - 94)  RR: 16 (03 Sep 2019 03:01) (16 - 33)  SpO2: 99% (03 Sep 2019 03:01) (78% - 100%)    PHYSICAL EXAM:  Gen: NAD, resting in bed. sedated  HEENT: Normocephalic, atraumatic  Neck: supple, no lymphadenopathy  CV: s1 s2 +  Lungs: few rhonchi   Abdomen: Soft, BS present  Ext: Warm, well perfused  Neuro: non focal   Skin: no rash, no erythema      TESTS & MEASUREMENTS:                        11.9   16.67 )-----------( 154      ( 02 Sep 2019 06:18 )             36.0     09-02    137  |  102  |  21<H>  ----------------------------<  112<H>  3.8   |  22  |  1.2    Ca    7.8<L>      02 Sep 2019 06:18  Mg     2.3     09-01    TPro  5.0<L>  /  Alb  3.2<L>  /  TBili  3.6<H>  /  DBili  x   /  AST  32  /  ALT  14  /  AlkPhos  76  09-01    eGFR if Non African American: 53 mL/min/1.73M2 (09-02-19 @ 06:18)  eGFR if African American: 61 mL/min/1.73M2 (09-02-19 @ 06:18)    LIVER FUNCTIONS - ( 01 Sep 2019 06:55 )  Alb: 3.2 g/dL / Pro: 5.0 g/dL / ALK PHOS: 76 U/L / ALT: 14 U/L / AST: 32 U/L / GGT: x               Culture - Blood (collected 09-01-19 @ 06:55)  Source: .Blood None  Preliminary Report (09-02-19 @ 18:01):    No growth to date.    Culture - Urine (collected 08-31-19 @ 14:18)  Source: .Urine Clean Catch (Midstream)  Final Report (09-02-19 @ 16:34):    >100,000 CFU/ml Escherichia coli  Organism: Escherichia coli (09-02-19 @ 16:34)  Organism: Escherichia coli (09-02-19 @ 16:34)      -  Amikacin: S <=16      -  Ampicillin: S <=8 These ampicillin results predict results for amoxicillin      -  Ampicillin/Sulbactam: S <=8/4 Enterobacter, Citrobacter, and Serratia may develop resistance during prolonged therapy (3-4 days)      -  Aztreonam: S <=4      -  Cefazolin: S <=8 ([id number]) For uncomplicated UTI with K. pneumoniae, E. coli, or P. mirablis: HOWARD <=16 is sensitive and HOWARD >=32 is resistant. This also predicts results for oral agents cefaclor, cefdinir, cefpodoxime, cefprozil, cefuroxime axetil, cephalexin and locarbef for uncomplicated UTI. Note that some isolates may be susceptible to these agents while testing resistant to cefazolin.      -  Cefepime: S <=4      -  Cefoxitin: S <=8      -  Ceftriaxone: S <=1 Enterobacter, Citrobacter, and Serratia may develop resistance during prolonged therapy      -  Ciprofloxacin: S <=1      -  Gentamicin: S <=4      -  Imipenem: S <=1      -  Levofloxacin: S <=2      -  Meropenem: S <=1      -  Nitrofurantoin: S <=32 Should not be used to treat pyelonephritis      -  Piperacillin/Tazobactam: S <=16      -  Tigecycline: S <=2      -  Tobramycin: S <=4      -  Trimethoprim/Sulfamethoxazole: S <=2/38      Method Type: HOWARD    Culture - Blood (collected 08-31-19 @ 14:05)  Source: .Blood Blood-Peripheral  Gram Stain (09-01-19 @ 11:16):    Growth in anaerobic bottle: Gram Negative Rods    Growth in aerobic bottle: Gram Negative Rods  Preliminary Report (09-02-19 @ 12:25):    Growth in aerobic and anaerobic bottles: Escherichia coli    See previous culture [id number]    Culture - Blood (collected 08-31-19 @ 14:05)  Source: .Blood Blood-Peripheral  Gram Stain (09-01-19 @ 11:25):    Growth in anaerobic bottle: Gram Negative Rods    Growth in aerobic bottle: Gram Negative Rods  Preliminary Report (09-02-19 @ 12:22):    Growth in aerobic and anaerobic bottles: Escherichia coli    "Due to technical problems, Proteus sp. will Not be reported as part of    the BCID panel until further notice"    ***Blood Panel PCR results on this specimen are available    approximately 3 hours after the Gram stain result.***    Gram stain, PCR, and/or culture results may not always    correspond due to difference in methodologies.    ************************************************************    This PCR assay was performed using [employer].    The following targets are tested for: Enterococcus,    vancomycin resistant enterococci, Listeria monocytogenes,    coagulase negative staphylococci, S. aureus,    methicillin resistant S. aureus, Streptococcus agalactiae    (Group B), S. pneumoniae, S.pyogenes (Group A),    Acinetobacter baumannii, Enterobacter cloacae, E. coli,    Klebsiella oxytoca, K. pneumoniae, Proteus sp.,    Serratia marcescens, Haemophilus influenzae,    Neisseria meningitidis, Pseudomonas aeruginosa, Candida    albicans, C. glabrata, C krusei, C parapsilosis,    C. tropicalis and the KPC resistance gene.  Organism: Blood Culture PCR (09-01-19 @ 10:07)  Organism: Blood Culture PCR (09-01-19 @ 10:07)      -  Escherichia coli: Detec      Method Type: PCR        Lactate, Blood: 5.4 mmol/L (08-31-19 @ 18:05)  Lactate, Blood: 2.1 mmol/L (08-31-19 @ 15:59)  Blood Gas Venous - Lactate: 2.2 mmoL/L (08-31-19 @ 15:44)      INFECTIOUS DISEASES TESTING      RADIOLOGY & ADDITIONAL TESTS:  I have personally reviewed the last Chest xray  CXR  Xray Chest 1 View- PORTABLE-Urgent:   EXAM:  XR CHEST PORTABLE URGENT 1V            PROCEDURE DATE:  08/31/2019            INTERPRETATION:  Clinical History / Reason for exam: Respiratory distress    Comparison : Chest radiograph 8/31/2019.    Technique/Positioning: Frontal.    Findings:    Support devices: ET tube mid trachea NG tube in stomach    Cardiac/mediastinum/hilum: Indeterminate    Lung parenchyma/Pleura: Within normal limits.    Skeleton/soft tissues: Unremarkable.    Impression:      No radiographic evidence of acute cardiopulmonary disease.                      ANGIE JACKSON M.D., ATTENDING RADIOLOGIST  This document has been electronically signed. Aug 31 2019  9:55PM             (08-31-19 @ 19:30)      CT      CARDIOLOGY TESTING  12 Lead ECG:   Ventricular Rate 125 BPM    Atrial Rate 125 BPM    P-R Interval 120 ms    QRS Duration 80 ms    Q-T Interval 412 ms    QTC Calculation(Bezet) 594 ms    R Axis 47 degrees    T Axis 62 degrees    Diagnosis Line Sinus tachycardia  Otherwise normal ECG    Confirmed by ALICE ZULETA MD [id number]) on 9/1/2019 9:56:00 AM (08-31-19 @ 18:00)  12 Lead ECG:   Ventricular Rate 109 BPM    Atrial Rate 109 BPM    P-R Interval 184 ms    QRS Duration 86 ms    Q-T Interval 338 ms    QTC Calculation(Bezet) 455 ms    P Axis 45 degrees    R Axis 11 degrees    T Axis 48 degrees    Diagnosis Line Sinus tachycardia  Otherwise normal ECG    Confirmed by ALICE ZULETA MD ([id number]) on 9/1/2019 9:55:57 AM (08-31-19 @ 14:38)      MEDICATIONS  aspirin enteric coated 81  dexmedetomidine Infusion 0.05  enoxaparin Injectable 40  meropenem  IVPB 1000  morphine  Infusion 5  sodium chloride 0.45%. 1000      ANTIBIOTICS:  meropenem  IVPB 1000 milliGRAM(s) IV Intermittent every 8 hours
Patient is a 89y old  Male who presents with a chief complaint of altered mental status , weakness, fever (01 Sep 2019 14:14)      Over Night Events:  Patient seen and examined on sedation on vent and [id number]       ROS:  See HPI    PHYSICAL EXAM    ICU Vital Signs Last 24 Hrs  T(C): 37.7 (02 Sep 2019 07:01), Max: 37.7 (02 Sep 2019 07:01)  T(F): 99.9 (02 Sep 2019 07:01), Max: 99.9 (02 Sep 2019 07:01)  HR: 154 (02 Sep 2019 06:30) (76 - 154)  BP: 100/59 (02 Sep 2019 06:30) (100/59 - 143/71)  BP(mean): 76 (02 Sep 2019 06:30) (76 - 101)  ABP: --  ABP(mean): --  RR: 18 (02 Sep 2019 07:01) (14 - 25)  SpO2: 99% (02 Sep 2019 06:30) (98% - 100%)      General:on sedation  HEENT:        et tube         Lymph Nodes: NO cervical LN   Lungs: Bilateral BS  Cardiovascular: Regular   Abdomen: Soft, Positive BS  Extremities: No clubbing   Skin: warm   Neurological: no focal motor power   Musculoskeletal: move all ext     I&O's Detail    01 Sep 2019 07:01  -  02 Sep 2019 07:00  --------------------------------------------------------  IN:    dexmedetomidine Infusion: 360 mL    fentaNYL Infusion.: 135 mL    fentaNYL Infusion.: 170 mL    IV PiggyBack: 100 mL    norepinephrine Infusion: 469 mL    sodium chloride 0.45%.: 2400 mL  Total IN: 3634 mL    OUT:    Ureteral Catheter: 1445 mL  Total OUT: 1445 mL    Total NET: 2189 mL      02 Sep 2019 07:01  -  02 Sep 2019 07:43  --------------------------------------------------------  IN:  Total IN: 0 mL    OUT:    Ureteral Catheter: 30 mL  Total OUT: 30 mL    Total NET: -30 mL          LABS:                          11.9   16.67 )-----------( 154      ( 02 Sep 2019 06:18 )             36.0         01 Sep 2019 06:55    139    |  104    |  20     ----------------------------<  140    4.1     |  21     |  1.3      Ca    7.9        01 Sep 2019 06:55  Mg     2.3       01 Sep 2019 06:55                                               PT/INR - ( 31 Aug 2019 18:05 )   PT: 14.40 sec;   INR: 1.25 ratio         PTT - ( 31 Aug 2019 18:05 )  PTT:26.8 sec                                       Urinalysis Basic - ( 31 Aug 2019 14:18 )    Color: Yellow / Appearance: Slightly Cloudy / S.015 / pH: x  Gluc: x / Ketone: Negative  / Bili: Negative / Urobili: 0.2 mg/dL   Blood: x / Protein: 30 mg/dL / Nitrite: Positive   Leuk Esterase: Moderate / RBC: >50 /HPF / WBC 3-5 /HPF   Sq Epi: x / Non Sq Epi: x / Bacteria: TNTC /HPF        Lactate, Blood: 5.4 mmol/L (19 @ 18:05)  Lactate, Blood: 2.1 mmol/L (19 @ 15:59)    CARDIAC MARKERS ( 01 Sep 2019 06:55 )  x     / 0.02 ng/mL / x     / x     / x      CARDIAC MARKERS ( 31 Aug 2019 21:34 )  x     / 0.03 ng/mL / x     / x     / x      CARDIAC MARKERS ( 31 Aug 2019 18:05 )  x     / <0.01 ng/mL / 61 U/L / x     / 1.9 ng/mL                                                        Culture - Urine (collected 31 Aug 2019 14:18)  Source: .Urine Clean Catch (Midstream)  Preliminary Report (01 Sep 2019 16:51):    >100,000 CFU/ml Gram Negative Rods    Culture - Blood (collected 31 Aug 2019 14:05)  Source: .Blood Blood-Peripheral  Gram Stain (01 Sep 2019 11:16):    Growth in anaerobic bottle: Gram Negative Rods    Growth in aerobic bottle: Gram Negative Rods  Preliminary Report (01 Sep 2019 11:17):    Growth in anaerobic bottle: Gram Negative Rods    Growth in aerobic bottle: Gram Negative Rods    Culture - Blood (collected 31 Aug 2019 14:05)  Source: .Blood Blood-Peripheral  Gram Stain (01 Sep 2019 11:25):    Growth in anaerobic bottle: Gram Negative Rods    Growth in aerobic bottle: Gram Negative Rods  Preliminary Report (01 Sep 2019 11:25):    Growth in anaerobic bottle: Gram Negative Rods    Growth in aerobic bottle: Gram Negative Rods    "Due to technical problems, Proteus sp. will Not be reported as part of    the BCID panel until further notice"    ***Blood Panel PCR results on this specimenare available    approximately 3 hours after the Gram stain result.***    Gram stain, PCR, and/or culture results may not always    correspond due to difference in methodologies.    ************************************************************    This PCR assaywas performed using [employer].    The following targets are tested for: Enterococcus,    vancomycin resistant enterococci, Listeria monocytogenes,    coagulase negative staphylococci, S. aureus,    methicillin resistant S. aureus, Streptococcus agalactiae    (Group B), S. pneumoniae, S. pyogenes (Group A),    Acinetobacter baumannii, Enterobacter cloacae, E. coli,    Klebsiella oxytoca, K. pneumoniae, Proteus sp.,    Serratia marcescens, Haemophilus influenzae,    Neisseria meningitidis, Pseudomonas aeruginosa, Candida    albicans, C. glabrata, C krusei, C parapsilosis,    C. tropicalis and the KPC resistance gene.  Organism: Blood Culture PCR (01 Sep 2019 10:07)  Organism: Blood Culture PCR (01 Sep 2019 10:07)                                                   Mode: Auto Mode: PRVC/ Volume Support  RR (machine): 15  TV (machine): 450  FiO2: 40  PEEP: 5  ITime:   MAP: 6  PIP: 10                                      ABG - ( 02 Sep 2019 04:23 )  pH, Arterial: 7.43  pH, Blood: x     /  pCO2: 36    /  pO2: 106   / HCO3: 24    / Base Excess: x     /  SaO2: 98                  MEDICATIONS  (STANDING):  aspirin enteric coated 81 milliGRAM(s) Oral daily  chlorhexidine 0.12% Liquid 15 milliLiter(s) Oral Mucosa every 12 hours  dexmedetomidine Infusion 0.02 MICROgram(s)/kG/Hr (0.5 mL/Hr) IV Continuous <Continuous>  enoxaparin Injectable 40 milliGRAM(s) SubCutaneous daily  fentaNYL   Infusion. 0.5 MICROgram(s)/kG/Hr (4.99 mL/Hr) IV Continuous <Continuous>  meropenem  IVPB 1000 milliGRAM(s) IV Intermittent every 8 hours  norepinephrine Infusion 0.05 MICROgram(s)/kG/Min (9.356 mL/Hr) IV Continuous <Continuous>  sodium chloride 0.45%. 1000 milliLiter(s) (100 mL/Hr) IV Continuous <Continuous>    MEDICATIONS  (PRN):  acetaminophen   Tablet .. 650 milliGRAM(s) Oral every 6 hours PRN Temp greater or equal to 38C (100.4F), Mild Pain (1 - 3), Moderate Pain (4 - 6)          Xrays:  TLC:  OG:  ET tube:                                                                                    small left effusion    ECHO:  CAM ICU:
Patient is a 89y old  Male who presents with a chief complaint of altered mental status , weakness, fever (02 Sep 2019 07:42)      T(F): 98.7 (09-02-19 @ 11:00), Max: 99.9 (09-02-19 @ 07:01)  HR: 93 (09-02-19 @ 10:00)  BP: 119/70 (09-02-19 @ 10:00)  RR: 20 (09-02-19 @ 11:00)  SpO2: 100% (09-02-19 @ 10:00) (98% - 100%)    PHYSICAL EXAM:  GENERAL: sedated on vent   HEAD:  Atraumatic, Normocephalic  NERVOUS SYSTEM:  no focal deficits  CHEST/LUNG: Clear to percussion bilaterally; No rales, rhonchi, wheezing, or rubs  HEART: Regular rate and rhythm; No murmurs, rubs, or gallops  ABDOMEN: Soft, Nontender, Nondistended; Bowel sounds present  EXTREMITIES:  2+ Peripheral Pulses, No clubbing, cyanosis, or edema  LYMPH: No lymphadenopathy noted  SKIN: No rashes or lesions    LABS  09-02    137  |  102  |  21<H>  ----------------------------<  112<H>  3.8   |  22  |  1.2    Ca    7.8<L>      02 Sep 2019 06:18  Mg     2.3     09-01    TPro  5.0<L>  /  Alb  3.2<L>  /  TBili  3.6<H>  /  DBili  x   /  AST  32  /  ALT  14  /  AlkPhos  76  09-01                          11.9   16.67 )-----------( 154      ( 02 Sep 2019 06:18 )             36.0     PT/INR - ( 31 Aug 2019 18:05 )   PT: 14.40 sec;   INR: 1.25 ratio         PTT - ( 31 Aug 2019 18:05 )  PTT:26.8 sec  Mode: Auto Mode: PRVC/ Volume Support  RR (machine): 15  TV (machine): 450  FiO2: 40  PEEP: 5  MAP: 6  PIP: 12    CARDIAC ENZYMES  Creatine Kinase, Serum: 61 (08-31 @ 18:05)    CKMB Units: 1.9 (08-31 @ 18:05)    Troponin T, Serum: 0.02 ng/mL (09-01-19 @ 06:55)  Troponin T, Serum: 0.03 ng/mL (08-31-19 @ 21:34)  Troponin T, Serum: <0.01 ng/mL (08-31-19 @ 18:05)      Culture Results:   >100,000 CFU/ml Gram Negative Rods (08-31-19)  Culture Results:   Growth in anaerobic bottle: Gram Negative Rods  Growth in aerobic bottle: Gram Negative Rods  "Due to technical problems, Proteus sp. will Not be reported as part of  the BCID panel until further notice"  ***Blood Panel PCR results on this specimenare available  approximately 3 hours after the Gram stain result.***  Gram stain, PCR, and/or culture results may not always  correspond due to difference in methodologies.  ************************************************************  This PCR assaywas performed using [employer].  The following targets are tested for: Enterococcus,  vancomycin resistant enterococci, Listeria monocytogenes,  coagulase negative staphylococci, S. aureus,  methicillin resistant S. aureus, Streptococcus agalactiae  (Group B), S. pneumoniae, S. pyogenes (Group A),  Acinetobacter baumannii, Enterobacter cloacae, E. coli,  Klebsiella oxytoca, K. pneumoniae, Proteus sp.,  Serratia marcescens, Haemophilus influenzae,  Neisseria meningitidis, Pseudomonas aeruginosa, Candida  albicans, C. glabrata, C krusei, C parapsilosis,  C. tropicalis and the KPC resistance gene. (08-31-19)  Culture Results:   Growth in anaerobic bottle: Gram Negative Rods  Growth in aerobic bottle: Gram Negative Rods (08-31-19)    RADIOLOGY  < from: Xray Chest 1 View- PORTABLE-Routine (09.02.19 @ 05:49) >  Impression:      Left basilar opacity. Support devices in place.    < end of copied text >    MEDICATIONS  (STANDING):  aspirin enteric coated 81 milliGRAM(s) Oral daily  dexmedetomidine Infusion 0.05 MICROgram(s)/kG/Hr (1.248 mL/Hr) IV Continuous <Continuous>  enoxaparin Injectable 40 milliGRAM(s) SubCutaneous daily  meropenem  IVPB 1000 milliGRAM(s) IV Intermittent every 8 hours  sodium chloride 0.45%. 1000 milliLiter(s) (100 mL/Hr) IV Continuous <Continuous>    MEDICATIONS  (PRN):
Patient is a 89y old  Male who presents with a chief complaint of altered mental status , weakness, fever (02 Sep 2019 07:42)      Vital Signs Last 24 Hrs  T(C): 36.3 (03 Sep 2019 07:01), Max: 36.7 (02 Sep 2019 19:00)  T(F): 97.4 (03 Sep 2019 07:01), Max: 98 (02 Sep 2019 19:00)  HR: 109 (03 Sep 2019 11:53) (67 - 166)  BP: 151/67 (03 Sep 2019 11:53) (72/47 - 168/75)  BP(mean): 97 (03 Sep 2019 11:53) (55 - 108)  RR: 18 (03 Sep 2019 13:01) (16 - 28)  SpO2: 78% (03 Sep 2019 13:01) (53% - 100%)    PHYSICAL EXAM:  GENERAL: On 100% NRM, Respiratory distress (moderate) / Obtunded    HEAD:  Atraumatic, Normocephalic  NERVOUS SYSTEM:  no focal deficits  CHEST/LUNG: ++ RHONCHI B/L   HEART: Regular rate and rhythm; No murmurs, rubs, or gallops  ABDOMEN: Soft, Nontender, Nondistended; Bowel sounds present  EXTREMITIES:  2+ Peripheral Pulses, No clubbing, cyanosis, or edema  LYMPH: No lymphadenopathy noted  SKIN: No rashes or lesions    LABS    09-02    137  |  102  |  21<H>  ----------------------------<  112<H>  3.8   |  22  |  1.2    Ca    7.8<L>      02 Sep 2019 06:18  Mg     2.3     09-01    TPro  5.0<L>  /  Alb  3.2<L>  /  TBili  3.6<H>  /  DBili  x   /  AST  32  /  ALT  14  /  AlkPhos  76  09-01                          11.9   16.67 )-----------( 154      ( 02 Sep 2019 06:18 )             36.0     PT/INR - ( 31 Aug 2019 18:05 )   PT: 14.40 sec;   INR: 1.25 ratio      CARDIAC ENZYMES  Creatine Kinase, Serum: 61 (08-31 @ 18:05)    CKMB Units: 1.9 (08-31 @ 18:05)    Troponin T, Serum: 0.02 ng/mL (09-01-19 @ 06:55)  Troponin T, Serum: 0.03 ng/mL (08-31-19 @ 21:34)  Troponin T, Serum: <0.01 ng/mL (08-31-19 @ 18:05)      Culture Results:   >100,000 CFU/ml Gram Negative Rods (08-31-19)  Culture Results:   Growth in anaerobic bottle: Gram Negative Rods  Growth in aerobic bottle: Gram Negative Rods  "Due to technical problems, Proteus sp. will Not be reported as part of  the BCID panel until further notice"  ***Blood Panel PCR results on this specimenare available  approximately 3 hours after the Gram stain result.***  Gram stain, PCR, and/or culture results may not always  correspond due to difference in methodologies.  ************************************************************  This PCR assaywas performed using [employer].  The following targets are tested for: Enterococcus,  vancomycin resistant enterococci, Listeria monocytogenes,  coagulase negative staphylococci, S. aureus,  methicillin resistant S. aureus, Streptococcus agalactiae  (Group B), S. pneumoniae, S. pyogenes (Group A),  Acinetobacter baumannii, Enterobacter cloacae, E. coli,  Klebsiella oxytoca, K. pneumoniae, Proteus sp.,  Serratia marcescens, Haemophilus influenzae,  Neisseria meningitidis, Pseudomonas aeruginosa, Candida  albicans, C. glabrata, C krusei, C parapsilosis,  C. tropicalis and the KPC resistance gene. (08-31-19)  Culture Results:   Growth in anaerobic bottle: Gram Negative Rods  Growth in aerobic bottle: Gram Negative Rods (08-31-19)    RADIOLOGY  < from: Xray Chest 1 View- PORTABLE-Routine (09.02.19 @ 05:49) >  Impression:      Left basilar opacity. Support devices in place.    < end of copied text >    MEDICATIONS  (STANDING):  aspirin enteric coated 81 milliGRAM(s) Oral daily  dexmedetomidine Infusion 0.05 MICROgram(s)/kG/Hr (1.248 mL/Hr) IV Continuous <Continuous>  enoxaparin Injectable 40 milliGRAM(s) SubCutaneous daily  meropenem  IVPB 1000 milliGRAM(s) IV Intermittent every 8 hours  morphine  Infusion 5 mG/Hr (5 mL/Hr) IV Continuous <Continuous>    MEDICATIONS  (PRN):  LORazepam   Injectable 1 milliGRAM(s) IntraMuscular every 2 hours PRN Anxiety
Patient is a 89y old  Male who presents with a chief complaint of altered mental status, weakness, fever (31 Aug 2019 18:55)      HPI:  89 year old man with history of Hypertension , Coronary artery disease  MI in past, status post stents , not a good historian ( no family present on bedside) who was brought for altered mental status , lethargy, fall, weaknes. He also gives associated complaint of nausea but no emesis.  He is unable to give any further history , does not remmebr his home meds .Patient denies any headache, any vision complaints, runny nose, fever, chills, sore throat. Denies chest pain, shortness of breath, palpitation. Denies abdominal pain., Denies urinary burning, urgency, frequency, dysuria. Denies weakness in any part of the body or numbness. (31 Aug 2019 16:09)  now vented on levophid     PAST MEDICAL & SURGICAL HISTORY:  Skin cancer  Prostate cancer  Arthritis  Neuropathy  Heart attack  Other age-related cataract of both eyes  History of bilateral knee replacement  Coronary stent patent    Allergies    No Known Allergies    Intolerances      Family history : no cardiovscular family history   Home Medications:  aspirin 81 mg oral tablet: 1 tab(s) orally once a day (31 Aug 2019 15:57)  atenolol:  (31 Aug 2019 15:57)  furosemide:  (31 Aug 2019 15:57)  potassium chloride:  (31 Aug 2019 15:57)    Occupation:  Alochol: Denied  Smoking: Denied  Drug Use: Denied  Marital Status:         ROS: as in HPI; All other systems reviewed are negative    ICU Vital Signs Last 24 Hrs  T(C): 37 (01 Sep 2019 07:01), Max: 39.2 (31 Aug 2019 19:00)  T(F): 98.6 (01 Sep 2019 07:01), Max: 102.6 (31 Aug 2019 19:00)  HR: 78 (01 Sep 2019 05:00) (78 - 133)  BP: 103/60 (01 Sep 2019 05:00) (51/35 - 174/104)  BP(mean): 77 (01 Sep 2019 05:00) (38 - 82)  ABP: --  ABP(mean): --  RR: 14 (01 Sep 2019 07:01) (13 - 28)  SpO2: 100% (01 Sep 2019 05:00) (94% - 100%)        Physical Examination:    General: on sedation     HEENT: Pupils equal, reactive to light.  Symmetric.    PULM: Clear to auscultation bilaterally, no significant sputum production    CVS: Regular rate and rhythm, no murmurs, rubs, or gallops    ABD: Soft, nondistended, nontender, normoactive bowel sounds, no masses    EXT: No edema, nontender, no clubbing     SKIN: Warm and well perfused, no rashes noted.    Neurology : no motor or sensory deficit     Musculoskeletal : move all extremity     Lymphatic system: no Palpable node       Mode: Auto Mode: PRVC/ Volume Support  RR (machine): 15  TV (machine): 450  FiO2: 40  PEEP: 5  MAP: 6  PIP: 12      ABG - ( 01 Sep 2019 04:28 )  pH, Arterial: 7.39  pH, Blood: x     /  pCO2: 39    /  pO2: 180   / HCO3: 24    / Base Excess: -1.3  /  SaO2: 100                 I&O's Detail    31 Aug 2019 07:  -  01 Sep 2019 07:00  --------------------------------------------------------  IN:    dexmedetomidine Infusion: 115 mL    fentaNYL Infusion.: 115 mL    IV PiggyBack: 250 mL    Lactated Ringers IV Bolus: 3200 mL    norepinephrine Infusion: 144 mL    sodium chloride 0.45%.: 700 mL    Sodium Chloride 0.9% IV Bolus: 1000 mL  Total IN: 5524 mL    OUT:    Ureteral Catheter: 1815 mL  Total OUT: 1815 mL    Total NET: 3709 mL      01 Sep 2019 07:  -  01 Sep 2019 07:50  --------------------------------------------------------  IN:  Total IN: 0 mL    OUT:    Ureteral Catheter: 40 mL  Total OUT: 40 mL    Total NET: -40 mL            LABS:                        12.3   7.62  )-----------( 152      ( 31 Aug 2019 18:05 )             36.6     31 Aug 2019 18:05    138    |  99     |  15     ----------------------------<  149    3.8     |  21     |  1.0      Ca    8.5        31 Aug 2019 18:05  Mg     1.4       31 Aug 2019 18:05    TPro  5.7    /  Alb  3.8    /  TBili  2.5    /  DBili  x      /  AST  27     /  ALT  12     /  AlkPhos  71     31 Aug 2019 18:05  Amylase x     lipase x          CARDIAC MARKERS ( 31 Aug 2019 21:34 )  x     / 0.03 ng/mL / x     / x     / x      CARDIAC MARKERS ( 31 Aug 2019 18:05 )  x     / <0.01 ng/mL / 61 U/L / x     / 1.9 ng/mL      CAPILLARY BLOOD GLUCOSE      POCT Blood Glucose.: 120 mg/dL (31 Aug 2019 16:52)    PT/INR - ( 31 Aug 2019 18:05 )   PT: 14.40 sec;   INR: 1.25 ratio         PTT - ( 31 Aug 2019 18:05 )  PTT:26.8 sec  Urinalysis Basic - ( 31 Aug 2019 14:18 )    Color: Yellow / Appearance: Slightly Cloudy / S.015 / pH: x  Gluc: x / Ketone: Negative  / Bili: Negative / Urobili: 0.2 mg/dL   Blood: x / Protein: 30 mg/dL / Nitrite: Positive   Leuk Esterase: Moderate / RBC: >50 /HPF / WBC 3-5 /HPF   Sq Epi: x / Non Sq Epi: x / Bacteria: TNTC /HPF      Culture    Lactate, Blood: 5.4 mmol/L (19 @ 18:05)  Lactate, Blood: 2.1 mmol/L (19 @ 15:59)      MEDICATIONS  (STANDING):  aspirin enteric coated 81 milliGRAM(s) Oral daily  chlorhexidine 0.12% Liquid 15 milliLiter(s) Oral Mucosa every 12 hours  dexmedetomidine Infusion 0.02 MICROgram(s)/kG/Hr (0.5 mL/Hr) IV Continuous <Continuous>  fentaNYL   Infusion. 0.5 MICROgram(s)/kG/Hr (2.495 mL/Hr) IV Continuous <Continuous>  meropenem  IVPB 1000 milliGRAM(s) IV Intermittent every 8 hours  norepinephrine Infusion 0.05 MICROgram(s)/kG/Min (9.356 mL/Hr) IV Continuous <Continuous>  sodium chloride 0.45%. 1000 milliLiter(s) (100 mL/Hr) IV Continuous <Continuous>    MEDICATIONS  (PRN):  acetaminophen   Tablet .. 650 milliGRAM(s) Oral every 6 hours PRN Temp greater or equal to 38C (100.4F), Mild Pain (1 - 3), Moderate Pain (4 - 6)        RADIOLOGY: ***     CXR:  TLC:  OG:  ET tube:    above tio   no infiltrate     CAM ICU:  ECHO:

## 2019-09-03 NOTE — DISCHARGE NOTE FOR THE EXPIRED PATIENT - HOSPITAL COURSE
89 year old man with history of Hypertension , Coronary artery disease  MI in past, status post stents , not a good historian ( no family present on bedside) who was brought for altered mental status , lethargy, weakness. He also gives associated complaint of nausea but no emesis. Pt was initially treated with antibioticfor urosepsis but became SOB and unresponsive requiring intubation. Pt was started on pressors for septic shock. Family was called for goals of cares. Expressed pt wishes is to be DNR/DNI. Family requested liberation for ventilator but cont antibiotic and morphine for comfort. Pt was liberated 09/02. On 09/03 pt was on morphine for comfort.  Called by nurse for pulselessness. Pt was unresponsive, no pulse palpated, no respiration or heart beat auscultated. Pt was pronounced 13:30. Family and attending notified

## 2019-09-03 NOTE — PROGRESS NOTE ADULT - PROBLEM SELECTOR PLAN 1
terminal wean per family   Taper abx - IF DESIRED by family to IV Cipro - at least 10 days   recall if needed

## 2019-09-03 NOTE — PROGRESS NOTE ADULT - ASSESSMENT
ASSESSMENT:  Septic Shock  secondary to Urinary tract infection with gram negative bacteremia  Coronary artery disease , status post stents    PLAN:    Goals of care discussion as documented by Dr. Zavala in previous days   Pt was liberated off VENT and Pressors per his wishes / HCP communications   Continue Meropenem  D/C IVF (pt in overload vs respiratory failure from aspiration post exturbation)   Morphine drip for comfort care  Ativan 2mg IV once for comfort care     DVT Prophylaxis: Heparin 5000 units sc q8hrs      POOR PROGNOSIS / ACTIVELY DRYING / FAMILY ON BOARD / DNR/DNI / COMFORT CARE ONLY

## 2019-09-03 NOTE — PROGRESS NOTE ADULT - REASON FOR ADMISSION
altered mental status , weakness, fever

## 2019-09-04 LAB
-  COAGULASE NEGATIVE STAPHYLOCOCCUS: SIGNIFICANT CHANGE UP
GRAM STN FLD: SIGNIFICANT CHANGE UP
METHOD TYPE: SIGNIFICANT CHANGE UP
SPECIMEN SOURCE: SIGNIFICANT CHANGE UP

## 2019-09-05 LAB
CULTURE RESULTS: SIGNIFICANT CHANGE UP
ORGANISM # SPEC MICROSCOPIC CNT: SIGNIFICANT CHANGE UP
ORGANISM # SPEC MICROSCOPIC CNT: SIGNIFICANT CHANGE UP
SPECIMEN SOURCE: SIGNIFICANT CHANGE UP

## 2019-09-06 LAB
CULTURE RESULTS: SIGNIFICANT CHANGE UP
SPECIMEN SOURCE: SIGNIFICANT CHANGE UP

## 2019-09-12 DIAGNOSIS — G62.9 POLYNEUROPATHY, UNSPECIFIED: ICD-10-CM

## 2019-09-12 DIAGNOSIS — B96.89 OTHER SPECIFIED BACTERIAL AGENTS AS THE CAUSE OF DISEASES CLASSIFIED ELSEWHERE: ICD-10-CM

## 2019-09-12 DIAGNOSIS — E87.2 ACIDOSIS: ICD-10-CM

## 2019-09-12 DIAGNOSIS — I25.10 ATHEROSCLEROTIC HEART DISEASE OF NATIVE CORONARY ARTERY WITHOUT ANGINA PECTORIS: ICD-10-CM

## 2019-09-12 DIAGNOSIS — I46.9 CARDIAC ARREST, CAUSE UNSPECIFIED: ICD-10-CM

## 2019-09-12 DIAGNOSIS — A41.51 SEPSIS DUE TO ESCHERICHIA COLI [E. COLI]: ICD-10-CM

## 2019-09-12 DIAGNOSIS — Z79.82 LONG TERM (CURRENT) USE OF ASPIRIN: ICD-10-CM

## 2019-09-12 DIAGNOSIS — Z95.5 PRESENCE OF CORONARY ANGIOPLASTY IMPLANT AND GRAFT: ICD-10-CM

## 2019-09-12 DIAGNOSIS — I10 ESSENTIAL (PRIMARY) HYPERTENSION: ICD-10-CM

## 2019-09-12 DIAGNOSIS — Z85.828 PERSONAL HISTORY OF OTHER MALIGNANT NEOPLASM OF SKIN: ICD-10-CM

## 2019-09-12 DIAGNOSIS — A41.9 SEPSIS, UNSPECIFIED ORGANISM: ICD-10-CM

## 2019-09-12 DIAGNOSIS — Z87.891 PERSONAL HISTORY OF NICOTINE DEPENDENCE: ICD-10-CM

## 2019-09-12 DIAGNOSIS — I25.2 OLD MYOCARDIAL INFARCTION: ICD-10-CM

## 2019-09-12 DIAGNOSIS — Z85.46 PERSONAL HISTORY OF MALIGNANT NEOPLASM OF PROSTATE: ICD-10-CM

## 2019-09-12 DIAGNOSIS — N39.0 URINARY TRACT INFECTION, SITE NOT SPECIFIED: ICD-10-CM

## 2019-09-12 DIAGNOSIS — J96.01 ACUTE RESPIRATORY FAILURE WITH HYPOXIA: ICD-10-CM

## 2019-09-12 DIAGNOSIS — R65.21 SEVERE SEPSIS WITH SEPTIC SHOCK: ICD-10-CM

## 2019-09-12 DIAGNOSIS — G93.41 METABOLIC ENCEPHALOPATHY: ICD-10-CM

## 2019-09-12 DIAGNOSIS — Z66 DO NOT RESUSCITATE: ICD-10-CM

## 2022-01-06 NOTE — ED ADULT NURSE NOTE - PAIN RATING/NUMBER SCALE (0-10): REST
[facility]  Urgent Care Encounter      CHIEF COMPLAINT       Chief Complaint   Patient presents with    Sinusitis    Pharyngitis    Nausea       Nurses Notes reviewed and I agree except as noted in the HPI. HISTORY OFPRESENT ILLNESS   Blair Brittle is a 61 y.o. The history is provided by the patient. No  was used. Influenza  Presenting symptoms: cough, fatigue, nausea and sore throat    Presenting symptoms: no diarrhea, no fever, no headaches, no myalgias, no rhinorrhea, no shortness of breath and no vomiting    Severity:  Moderate  Onset quality:  Sudden  Progression:  Unchanged  Chronicity:  New  Relieved by:  Nothing  Worsened by:  Nothing  Ineffective treatments:  Hot fluids and rest  Associated symptoms: decreased appetite, decreased physical activity and nasal congestion    Associated symptoms: no chills, no ear pain, no mental status change, no neck stiffness and no syncope    Risk factors: not elderly, no diabetes problem, no heart disease, no immunocompromised state, no kidney disease, no liver disease, not pregnant and no sick contacts        REVIEW OF SYSTEMS     Review of Systems   Constitutional: Positive for decreased appetite and fatigue. Negative for activity change, appetite change, chills, diaphoresis and fever. HENT: Positive for congestion, postnasal drip, sinus pressure and sore throat. Negative for ear pain and rhinorrhea. Respiratory: Positive for cough. Negative for apnea, choking, chest tightness, shortness of breath, wheezing and stridor. Cardiovascular: Negative for chest pain, palpitations and leg swelling. Gastrointestinal: Positive for nausea. Negative for diarrhea and vomiting. Musculoskeletal: Negative for myalgias and neck stiffness. Neurological: Negative for dizziness, light-headedness and headaches.        PAST MEDICAL HISTORY         Diagnosis Date    CAD (coronary artery disease)     Headache     Hyperlipidemia  Hypertension        SURGICAL HISTORY     Patient  has a past surgical history that includes hernia repair; Cardiac catheterization (2004); shoulder surgery (Bilateral); Dental surgery (2004); Tonsillectomy; and lumbar fusion (N/A, 10/2/2019). CURRENT MEDICATIONS       Previous Medications    LISINOPRIL (PRINIVIL;ZESTRIL) 10 MG TABLET    Take 10 mg by mouth daily    NITROGLYCERIN (NITROLINGUAL) 0.4 MG/SPRAY 0.4 MG SPRAY    Place 1 spray under the tongue every 5 minutes as needed     ROSUVASTATIN (CRESTOR) 20 MG TABLET    TAKE 1 TABLET BY MOUTH DAILY       ALLERGIES     Patient is has No Known Allergies. FAMILY HISTORY     Patient's family history includes Diabetes in his mother; High Blood Pressure in his father; High Cholesterol in his father. SOCIAL HISTORY     Patient  reports that he quit smoking about 17 years ago. He has never used smokeless tobacco. He reports that he does not drink alcohol and does not use drugs. PHYSICAL EXAM     ED TRIAGE VITALS  BP: (!) 158/84, Temp: 99.2 °F (37.3 °C), Pulse: 111, Resp: 22, SpO2: (!) 88 %  Physical Exam  Vitals and nursing note reviewed. Constitutional:       General: He is not in acute distress. Appearance: He is well-developed. He is obese. He is not ill-appearing, toxic-appearing or diaphoretic. HENT:      Head: Normocephalic and atraumatic. Mouth/Throat:      Mouth: Mucous membranes are moist.      Pharynx: Uvula midline. Pulmonary:      Effort: Pulmonary effort is normal. No respiratory distress. Breath sounds: Normal breath sounds. No stridor. No wheezing, rhonchi or rales. Chest:      Chest wall: No tenderness. Musculoskeletal:      Cervical back: Normal range of motion. Neurological:      General: No focal deficit present. Mental Status: He is alert and oriented to person, place, and time.    Psychiatric:         Mood and Affect: Mood normal.         Behavior: Behavior normal.         DIAGNOSTIC RESULTS Labs:  Results for orders placed or performed during the hospital encounter of 01/06/22   COVID-19, Rapid   Result Value Ref Range    SARS-CoV-2, CORBIN DETECTED (AA) NOT DETECTED       IMAGING:  No orders to display     URGENT CARE COURSE:     Vitals:    01/06/22 1816   BP: (!) 158/84   Pulse: 111   Resp: 22   Temp: 99.2 °F (37.3 °C)   TempSrc: Temporal   SpO2: (!) 88%   Weight: 220 lb (99.8 kg)   Height: 5' 5\" (1.651 m)       Medications - No data to display  PROCEDURES:  None  FINAL IMPRESSION      1. Upper respiratory tract infection due to COVID-19 virus        DISPOSITION/PLAN   Decision To Discharge    Suggestions for symptom management:   If you have questions regarding allergies or contraindications to use, please speak to the pharmacy staff or your family provider. For fevers or pain: acetaminophen (Tylenol)  For dry cough: medications containing dextromethorphan, such as Delsym, Robitussin DM or Mucinex DM and medicated throat lozenges  For congestion or sinus pressure: decongestant nasal sprays, such as Afrin (for up to 3 days), medications containing guaifenesin to help break up mucus, such as Mucinex or Robitussin, nasal steroid sprays, such as Flonase, Sensimist, Rhinocort or Nasonex and saline nasal sprays, neti pot or sinus rinse bottle  For runny nose, sneezing or watery/itchy eyes: less sedating antihistamines, such as loratidine (Claritin), fexofenadine (Allegra) or Cetirizine (Zyrtec) and antihistamine eye drops, such as ketotifen (Zaditor, Alaway) or olopatadine (Pataday)  For sore throat:  Chloraseptic throat spray or sore throat lozenges   If you have high blood pressure, you should avoid medications containing pseudoephedrine or phenylephrine, such as Sudafed,  running a humidifier in your bedroom may be helpful for many of your symptoms. If your cough is keeping you awake at night, you can try raising your head with an extra pillow.   If the skin around your nose and lips becomes sore, you can put some petroleum jelly on the area. Coricidin HBP may be an option.         PATIENT REFERRED TO:  DMITRIY Mike CNP  [id number] [facility]  [facility] [phone]    Schedule an appointment as soon as possible for a visit       DISCHARGE MEDICATIONS:  New Prescriptions    ACETAMINOPHEN (TYLENOL) 500 MG TABLET    Take 1 tablet by mouth every 4 hours as needed for Pain or Fever    ASCORBIC ACID (VITAMIN C) 500 MG TABLET    Take 1 tablet by mouth 2 times daily for 7 days    AZELASTINE (ASTELIN) 0.1 % NASAL SPRAY    1 spray by Nasal route 2 times daily Use in each nostril as directed    ONDANSETRON (ZOFRAN ODT) 4 MG DISINTEGRATING TABLET    Take 1 tablet by mouth every 8 hours as needed for Nausea or Vomiting    PROMETHAZINE-DEXTROMETHORPHAN (PROMETHAZINE-DM) 6.25-15 MG/5ML SYRUP    Take 5 mLs by mouth 4 times daily as needed for Cough    ZINC SULFATE (ZINCATE) 220 (50 ZN) MG CAPSULE    Take 1 capsule by mouth daily for 7 days     Current Discharge Medication List          DMITRIY Weiner CNP, APRN - CNP  01/06/22 [id number]
